# Patient Record
Sex: FEMALE | Race: WHITE | NOT HISPANIC OR LATINO
[De-identification: names, ages, dates, MRNs, and addresses within clinical notes are randomized per-mention and may not be internally consistent; named-entity substitution may affect disease eponyms.]

---

## 2018-11-27 PROBLEM — Z00.00 ENCOUNTER FOR PREVENTIVE HEALTH EXAMINATION: Status: ACTIVE | Noted: 2018-11-27

## 2018-12-07 ENCOUNTER — APPOINTMENT (OUTPATIENT)
Dept: THORACIC SURGERY | Facility: HOSPITAL | Age: 83
End: 2018-12-07
Payer: MEDICARE

## 2018-12-07 PROCEDURE — ZZZZZ: CPT

## 2018-12-19 ENCOUNTER — RECORD ABSTRACTING (OUTPATIENT)
Age: 83
End: 2018-12-19

## 2018-12-19 DIAGNOSIS — Z86.69 PERSONAL HISTORY OF OTHER DISEASES OF THE NERVOUS SYSTEM AND SENSE ORGANS: ICD-10-CM

## 2018-12-19 DIAGNOSIS — Z82.49 FAMILY HISTORY OF ISCHEMIC HEART DISEASE AND OTHER DISEASES OF THE CIRCULATORY SYSTEM: ICD-10-CM

## 2018-12-19 DIAGNOSIS — Z87.39 PERSONAL HISTORY OF OTHER DISEASES OF THE MUSCULOSKELETAL SYSTEM AND CONNECTIVE TISSUE: ICD-10-CM

## 2018-12-19 DIAGNOSIS — Z80.1 FAMILY HISTORY OF MALIGNANT NEOPLASM OF TRACHEA, BRONCHUS AND LUNG: ICD-10-CM

## 2018-12-19 RX ORDER — MULTIVITAMIN
TABLET ORAL
Refills: 0 | Status: ACTIVE | COMMUNITY

## 2018-12-24 ENCOUNTER — APPOINTMENT (OUTPATIENT)
Dept: CARDIOLOGY | Facility: CLINIC | Age: 83
End: 2018-12-24

## 2018-12-25 ENCOUNTER — RECORD ABSTRACTING (OUTPATIENT)
Age: 83
End: 2018-12-25

## 2018-12-26 ENCOUNTER — APPOINTMENT (OUTPATIENT)
Dept: CARDIOLOGY | Facility: CLINIC | Age: 83
End: 2018-12-26
Payer: MEDICARE

## 2018-12-26 VITALS — DIASTOLIC BLOOD PRESSURE: 62 MMHG | SYSTOLIC BLOOD PRESSURE: 130 MMHG

## 2018-12-26 VITALS
DIASTOLIC BLOOD PRESSURE: 60 MMHG | HEIGHT: 64 IN | SYSTOLIC BLOOD PRESSURE: 130 MMHG | BODY MASS INDEX: 22.2 KG/M2 | WEIGHT: 130 LBS | HEART RATE: 55 BPM

## 2018-12-26 PROCEDURE — 99214 OFFICE O/P EST MOD 30 MIN: CPT

## 2018-12-26 PROCEDURE — 93000 ELECTROCARDIOGRAM COMPLETE: CPT

## 2018-12-26 NOTE — PHYSICAL EXAM
[General Appearance - Well Developed] : well developed [Normal Appearance] : normal appearance [Well Groomed] : well groomed [General Appearance - Well Nourished] : well nourished [No Deformities] : no deformities [General Appearance - In No Acute Distress] : no acute distress [Normal Conjunctiva] : the conjunctiva exhibited no abnormalities [Eyelids - No Xanthelasma] : the eyelids demonstrated no xanthelasmas [Normal Oral Mucosa] : normal oral mucosa [No Oral Pallor] : no oral pallor [No Oral Cyanosis] : no oral cyanosis [Normal Jugular Venous A Waves Present] : normal jugular venous A waves present [Normal Jugular Venous V Waves Present] : normal jugular venous V waves present [No Jugular Venous Blackman A Waves] : no jugular venous blackman A waves [Respiration, Rhythm And Depth] : normal respiratory rhythm and effort [Exaggerated Use Of Accessory Muscles For Inspiration] : no accessory muscle use [Auscultation Breath Sounds / Voice Sounds] : lungs were clear to auscultation bilaterally [Heart Rate And Rhythm] : heart rate and rhythm were normal [Heart Sounds] : normal S1 and S2 [Murmurs] : no murmurs present [Abdomen Soft] : soft [Abdomen Tenderness] : non-tender [Abdomen Mass (___ Cm)] : no abdominal mass palpated [Skin Color & Pigmentation] : normal skin color and pigmentation [] : no rash [No Venous Stasis] : no venous stasis [Skin Lesions] : no skin lesions [No Skin Ulcers] : no skin ulcer [No Xanthoma] : no  xanthoma was observed [Oriented To Time, Place, And Person] : oriented to person, place, and time [Affect] : the affect was normal [Mood] : the mood was normal [No Anxiety] : not feeling anxious [FreeTextEntry1] : trace edema

## 2018-12-26 NOTE — ASSESSMENT
[FreeTextEntry1] : EKG in 12/26/18. Sinus bradycardia. Heart rate 55. Otherwise within normal limits.

## 2018-12-26 NOTE — ADDENDUM
[FreeTextEntry1] : This report was generated using Dragon Dictation. Please excuse obvious typographical errors and contact this office for any questions. Any preliminary copy of this note given to the patient at the time of this visit has not been proofread or edited. This note is a part of a shared electronic record used by our physicians and may contain information generated by other physicians in this practice in addition to your visit with this office.

## 2018-12-26 NOTE — DISCUSSION/SUMMARY
[FreeTextEntry1] : Cerebrovascular disease, possible cerebrovascular events. At the present time there is no correlation of these events with a cardiac arrhythmia. We are following the patient with an implantable looping monitor. I suggest additional workup as directed by her primary care physician. She is under the care of a neurologist, Dr. Patt Goss. Evidence of small vessel disease on previous MRI. Continue aspirin.\par \par Supraventricular tachycardia. At this point no correlation with her events. Continue aspirin and beta blocker.\par \par Valvular disease. Minor findings as noted on her echocardiogram.

## 2018-12-26 NOTE — HISTORY OF PRESENT ILLNESS
[FreeTextEntry1] : Since her last assessment the patient has had an implanted Loop monitor. She has had a number of events perhaps 3 episodes of staring or cognitive impairment. One time she reported some nausea.\par \par We have reviewed the events through the computer. There have been no cardiac arrhythmias associated with these events\par \par There have been no acute symptoms of shortness of breath, chest discomfort, palpitation, fainting

## 2018-12-26 NOTE — REASON FOR VISIT
[FreeTextEntry1] : This 85-year-old female patient presents for continuing evaluation of episodic possible cerebral vascular events manifested as cognitive impairment and change in mental status, cardiac arrhythmias, valve abnormalities.\par \par She has additional medical problems as noted.\par \par This is her first visit after implantation of a loop monitor by Doctor Preston.

## 2019-05-06 ENCOUNTER — RX RENEWAL (OUTPATIENT)
Age: 84
End: 2019-05-06

## 2019-06-19 ENCOUNTER — APPOINTMENT (OUTPATIENT)
Dept: CARDIOLOGY | Facility: CLINIC | Age: 84
End: 2019-06-19

## 2019-07-02 ENCOUNTER — APPOINTMENT (OUTPATIENT)
Dept: HEMATOLOGY ONCOLOGY | Facility: CLINIC | Age: 84
End: 2019-07-02
Payer: MEDICARE

## 2019-07-02 ENCOUNTER — RESULT REVIEW (OUTPATIENT)
Age: 84
End: 2019-07-02

## 2019-07-02 VITALS
SYSTOLIC BLOOD PRESSURE: 136 MMHG | HEIGHT: 63.78 IN | BODY MASS INDEX: 21.43 KG/M2 | RESPIRATION RATE: 18 BRPM | DIASTOLIC BLOOD PRESSURE: 54 MMHG | OXYGEN SATURATION: 100 % | WEIGHT: 124 LBS | TEMPERATURE: 98 F | HEART RATE: 50 BPM

## 2019-07-02 DIAGNOSIS — Z82.49 FAMILY HISTORY OF ISCHEMIC HEART DISEASE AND OTHER DISEASES OF THE CIRCULATORY SYSTEM: ICD-10-CM

## 2019-07-02 PROCEDURE — 99204 OFFICE O/P NEW MOD 45 MIN: CPT

## 2019-07-06 NOTE — ASSESSMENT
[FreeTextEntry1] : 86 yo female with h/o left breast cancer, lobular, ER/WA positive, [id number] negative.\par Patient under observation, s/p RT, arimidex\par - mammogram June 2019 - reviewed\par - continue surveillance\par - bone density due\par - Discussed weight control and healthy eating habits.  Encourage to participate in moderate exercise.\par

## 2019-07-06 NOTE — PHYSICAL EXAM
[Fully active, able to carry on all pre-disease performance without restriction] : Status 0 - Fully active, able to carry on all pre-disease performance without restriction [Normal] : affect appropriate [de-identified] : left breast scar

## 2019-07-06 NOTE — HISTORY OF PRESENT ILLNESS
[de-identified] : 85 year old presents today as a transfer of care from Dr. Burkett- diagnosed with breast cancer in .  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  Patient underwent a left breast wide excision on 2006 along with RT.  She was seeing oncologist every 6 months and here for a transfer of care.  Patient is unclear if she took Arimidex as per note from other oncologist.  Patient is G3, P3- denies use of HRT in past or currently.  She was never a smoker- only family history of malignancy was her father who is  from lung cancer- denies having genetic testing done in past.  \par \par \par \par \par Mammogram done 2019-\par Bone Density- 2017- osteoporotic bone  mineral density of the radius and ulna and osteoporotic bone mineral- she states she received an infusion for OP in the past.

## 2019-10-14 DIAGNOSIS — Z01.89 ENCOUNTER FOR OTHER SPECIFIED SPECIAL EXAMINATIONS: ICD-10-CM

## 2019-10-14 DIAGNOSIS — R94.30 ABNORMAL RESULT OF CARDIOVASCULAR FUNCTION STUDY, UNSPECIFIED: ICD-10-CM

## 2019-10-15 ENCOUNTER — APPOINTMENT (OUTPATIENT)
Dept: CARDIOLOGY | Facility: CLINIC | Age: 84
End: 2019-10-15
Payer: MEDICARE

## 2019-10-15 ENCOUNTER — NON-APPOINTMENT (OUTPATIENT)
Age: 84
End: 2019-10-15

## 2019-10-15 VITALS
DIASTOLIC BLOOD PRESSURE: 70 MMHG | WEIGHT: 124 LBS | SYSTOLIC BLOOD PRESSURE: 126 MMHG | BODY MASS INDEX: 21.43 KG/M2 | HEIGHT: 63.78 IN | HEART RATE: 51 BPM

## 2019-10-15 PROCEDURE — 99214 OFFICE O/P EST MOD 30 MIN: CPT

## 2019-10-15 PROCEDURE — 93000 ELECTROCARDIOGRAM COMPLETE: CPT

## 2019-10-15 NOTE — REASON FOR VISIT
[FreeTextEntry1] : Ms. ROSEY RODGERS has the following problem list:\par \par Cardiac arrhythmia/ implanted loop monitoring.\par Valve abnormalities\par Episodic cerebrovascular events/cognitive impairment\par \par She has additional medical problems as noted.\par \par Her primary care physician is Dr. Vann\par

## 2019-10-15 NOTE — HISTORY OF PRESENT ILLNESS
[FreeTextEntry1] : This 85 year-old female patient presents for cardiovascular evaluation.\par \par Her problem list is as noted above.\par \par Since her last examination in December 2018 she has had some medical interactions.\par \par She reports that she had bilateral carpal tunnel surgery.\par She did also suffer a fall with a right elbow injury, treated conservatively.\par She also reports that she had Mohs surgery on her nose.\par She also reports she had a neurologic evaluation with Dr. Morton.\par \par Despite these issues she reports no major events or hospitalizations.  She is active and walking.  There are no exertional symptoms of shortness of breath, chest discomfort, palpitation, lightheadedness.\par \par She continues to have issues with her memory and word finding.  She also feels periodically that her "head is full".  Evidently there was no major neurologic event on her neurologic evaluation.

## 2019-10-15 NOTE — DISCUSSION/SUMMARY
[FreeTextEntry1] : Brief recommendations and follow-up: (see above for details)\par \par Continue current routine.\par Patient had held her aspirin this will be resumed.\par I asked her to call her primary care physician to kindly forward a copy of her most recent laboratories, particularly her lipid profile.\par In view of her cerebrovascular disease I would like to see her LDL cholesterol at least less than 100.

## 2019-10-15 NOTE — ASSESSMENT
[FreeTextEntry1] : EKG 10/15/2019.  Sinus bradycardia.  Heart rate 52.  Otherwise within normal limits.\par EKG in 12/26/18. Sinus bradycardia. Heart rate 55. Otherwise within normal limits.

## 2019-11-05 ENCOUNTER — LABORATORY RESULT (OUTPATIENT)
Age: 84
End: 2019-11-05

## 2019-11-06 ENCOUNTER — APPOINTMENT (OUTPATIENT)
Dept: ENDOCRINOLOGY | Facility: CLINIC | Age: 84
End: 2019-11-06
Payer: MEDICARE

## 2019-11-06 VITALS
DIASTOLIC BLOOD PRESSURE: 64 MMHG | HEART RATE: 57 BPM | OXYGEN SATURATION: 99 % | WEIGHT: 125 LBS | BODY MASS INDEX: 22.15 KG/M2 | HEIGHT: 63 IN | SYSTOLIC BLOOD PRESSURE: 118 MMHG

## 2019-11-06 PROCEDURE — 99214 OFFICE O/P EST MOD 30 MIN: CPT

## 2019-11-06 NOTE — PHYSICAL EXAM
[Alert] : alert [No Acute Distress] : no acute distress [Well Nourished] : well nourished [Well Developed] : well developed [Normal Sclera/Conjunctiva] : normal sclera/conjunctiva [EOMI] : extra ocular movement intact [No Proptosis] : no proptosis [Normal Oropharynx] : the oropharynx was normal [No Thyroid Nodules] : there were no palpable thyroid nodules [No Respiratory Distress] : no respiratory distress [No Accessory Muscle Use] : no accessory muscle use [Clear to Auscultation] : lungs were clear to auscultation bilaterally [Normal Rate] : heart rate was normal  [Normal S1, S2] : normal S1 and S2 [Regular Rhythm] : with a regular rhythm [Pedal Pulses Normal] : the pedal pulses are present [No Edema] : there was no peripheral edema [Normal Bowel Sounds] : normal bowel sounds [Not Tender] : non-tender [Soft] : abdomen soft [Not Distended] : not distended [Post Cervical Nodes] : posterior cervical nodes [Anterior Cervical Nodes] : anterior cervical nodes [Axillary Nodes] : axillary nodes [Normal] : normal and non tender [No Spinal Tenderness] : no spinal tenderness [Spine Straight] : spine straight [No Stigmata of Cushings Syndrome] : no stigmata of cushings syndrome [Normal Gait] : normal gait [Normal Strength/Tone] : muscle strength and tone were normal [No Rash] : no rash [Acanthosis Nigricans] : no acanthosis nigricans [Normal Reflexes] : deep tendon reflexes were 2+ and symmetric [No Tremors] : no tremors [Oriented x3] : oriented to person, place, and time [de-identified] : mildly enlarged thyroid on exam

## 2019-11-06 NOTE — HISTORY OF PRESENT ILLNESS
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 \par       one Reclast infusion 8/19/18\par        labs reviewed, good , last Vit D with PCP 8/19 good\par last iPTH good last year\par        was on Fosamax once a week started 11/2013 stopped 9/2015\par        pt is a poor historian brought in records today\par        has h/o breast cancer wit bone Rx so not a candidate for forteo\par        was on femara\par        notes from Dr Hellerman reviewed\par        never had fractures\par        never had kidney stone \par        sees her dentist twice a year Dr Bailey , I called him and he cleared patient for Reclast see phone notes\par        "mother of 3 - 2006 required surgery for L breast cancer (Dr. Francisco/[facility]) followed by RT and then chemotherapy by Dr. Woodruff including Femara. Subsequently started on Fosamax for a while and then Actonel but eventually switched to calcitonin nasal spray. Takes calcium 500 plus DD 1400 plus multivit. Her dentist, Dr. Zeyad Bailey, in [place] agreed with her regarding a tooth extraction that Fosamax might have played a role. Recently ran out of calcitonin" per Dr Hellerman \par        8/2013 left hip and spine was osteopenia L spine T score -2.1\par        left hip was -2.3\par        left forearm -2.7\par        c/w 1997 was an increase in her L spine\par        no change at the hip\par        statistically significant left forearm\par        11/2017 \par        lumbar spine vertebrae are statistically ineligible and cannot be assesed\par        hip T score -2.3\par        femoral neck T score -3.2\par        left forearm T score -2.9\par        hip no change\par        wrist decrease by 6.7% c/w 1997, no change c/w 2015\par        has a healthy diet, has no coffe but dark chocolate\par        Calcium and Vit D3 does not know the dose 600mg-400 IU once a day\par        was also on Femara \par        also on MVI.

## 2019-12-05 ENCOUNTER — RESULT REVIEW (OUTPATIENT)
Age: 84
End: 2019-12-05

## 2020-01-06 ENCOUNTER — RESULT REVIEW (OUTPATIENT)
Age: 85
End: 2020-01-06

## 2020-01-06 ENCOUNTER — APPOINTMENT (OUTPATIENT)
Dept: HEMATOLOGY ONCOLOGY | Facility: CLINIC | Age: 85
End: 2020-01-06
Payer: MEDICARE

## 2020-01-06 VITALS
SYSTOLIC BLOOD PRESSURE: 124 MMHG | BODY MASS INDEX: 21.88 KG/M2 | OXYGEN SATURATION: 100 % | RESPIRATION RATE: 16 BRPM | WEIGHT: 123.5 LBS | DIASTOLIC BLOOD PRESSURE: 51 MMHG | HEIGHT: 62.99 IN | TEMPERATURE: 97.4 F | HEART RATE: 52 BPM

## 2020-01-06 PROCEDURE — 99214 OFFICE O/P EST MOD 30 MIN: CPT

## 2020-01-24 NOTE — HISTORY OF PRESENT ILLNESS
[de-identified] : 85 year old presents today as a transfer of care from Dr. Burkett- diagnosed with breast cancer in .  Pathology from 2006- Infiltrating lobular carcinoma grade II/III measures 0.73cm, ER 85%, DC 3%, HER 2 negative.  Patient underwent a left breast wide excision on 2006 along with RT.  She was seeing oncologist every 6 months and here for a transfer of care.  Patient is unclear if she took Arimidex as per note from other oncologist.  Patient is G3, P3- denies use of HRT in past or currently.  She was never a smoker- only family history of malignancy was her father who is  from lung cancer- denies having genetic testing done in past.  \par \par \par \par Mammogram done 2019-\par Bone Density- 2017- osteoporotic bone  mineral density of the radius and ulna and osteoporotic bone mineral- she states she received an infusion for OP in the past.   [de-identified] : Patient here for second visit after initial consultation for breast cancer. Last mammo was 6/24/19. Last bone density 12/5/19. X-rays of hip done last week in [place] showed mild arthritis.

## 2020-01-24 NOTE — PHYSICAL EXAM
[Fully active, able to carry on all pre-disease performance without restriction] : Status 0 - Fully active, able to carry on all pre-disease performance without restriction [Normal] : affect appropriate [de-identified] : left breast scar

## 2020-01-24 NOTE — ASSESSMENT
[FreeTextEntry1] : 87 yo female with h/o left breast cancer, lobular, ER/CA positive, HER2 douglas negative.\par Patient under observation, s/p RT, arimidex\par - mammogram June 2019 - reviewed\par - continue surveillance\par \par Osteoporosis \par - last bone density 12/19 \par T-score- 2.6, - 3.1 \par Risk factors: AI, Postmenopausal \par Recommended:\par 1. Vitamin D\par 2. Calcium supplement 500mg\par 3. Weight bearing exercises\par 4. Reclast s/p x 2- November 2019 \par \par \par - Discussed weight control and healthy eating habits.  Encourage to participate in moderate exercise.\par

## 2020-05-06 ENCOUNTER — LABORATORY RESULT (OUTPATIENT)
Age: 85
End: 2020-05-06

## 2020-05-06 ENCOUNTER — RX RENEWAL (OUTPATIENT)
Age: 85
End: 2020-05-06

## 2020-06-08 ENCOUNTER — APPOINTMENT (OUTPATIENT)
Dept: HEMATOLOGY ONCOLOGY | Facility: CLINIC | Age: 85
End: 2020-06-08
Payer: MEDICARE

## 2020-06-08 ENCOUNTER — RESULT REVIEW (OUTPATIENT)
Age: 85
End: 2020-06-08

## 2020-06-08 VITALS
TEMPERATURE: 98.1 F | HEART RATE: 56 BPM | HEIGHT: 62.99 IN | RESPIRATION RATE: 16 BRPM | OXYGEN SATURATION: 97 % | DIASTOLIC BLOOD PRESSURE: 71 MMHG | SYSTOLIC BLOOD PRESSURE: 148 MMHG | WEIGHT: 118 LBS | BODY MASS INDEX: 20.91 KG/M2

## 2020-06-08 PROCEDURE — 99214 OFFICE O/P EST MOD 30 MIN: CPT

## 2020-06-08 NOTE — ASSESSMENT
[FreeTextEntry1] : 85 yo female with h/o left breast cancer, lobular, ER/IL positive, HER2 douglas negative. 2006\par Patient under observation, s/p RT, arimidex completetd\par - mammogram June 2019 - reviewed\par - continue surveillance\par \par Osteoporosis \par - last bone density 12/19 \par T-score- 2.6, - 3.1 \par Risk factors: AI, Postmenopausal \par Recommended:\par 1. Vitamin D\par 2. Calcium supplement 500mg\par 3. Weight bearing exercises\par 4. Reclast s/p x 2- November 2019 \par \par \par - Discussed weight control and healthy eating habits.  Encourage to participate in moderate exercise.\par

## 2020-06-08 NOTE — PHYSICAL EXAM
[Fully active, able to carry on all pre-disease performance without restriction] : Status 0 - Fully active, able to carry on all pre-disease performance without restriction [Normal] : affect appropriate [de-identified] : left breast scar

## 2020-06-08 NOTE — HISTORY OF PRESENT ILLNESS
[de-identified] : 85 year old presents today as a transfer of care from Dr. Burkett- diagnosed with breast cancer in .  Pathology from 2006- Infiltrating lobular carcinoma grade II/III measures 0.73cm, ER 85%, KS 3%, HER 2 negative.  Patient underwent a left breast wide excision on 2006 along with RT.  She was seeing oncologist every 6 months and here for a transfer of care.  Patient is unclear if she took Arimidex as per note from other oncologist.  Patient is G3, P3- denies use of HRT in past or currently.  She was never a smoker- only family history of malignancy was her father who is  from lung cancer- denies having genetic testing done in past.  \par \par \par \par Mammogram done 2019-\par Bone Density- 2017- osteoporotic bone  mineral density of the radius and ulna and osteoporotic bone mineral- she states she received an infusion for OP in the past.   [de-identified] : Patient here for second visit after initial consultation for breast cancer. Last mammo was 6/24/19. Last bone density 12/5/19. X-rays of hip done last week in [place] showed mild arthritis.

## 2020-06-19 ENCOUNTER — NON-APPOINTMENT (OUTPATIENT)
Age: 85
End: 2020-06-19

## 2020-06-19 ENCOUNTER — APPOINTMENT (OUTPATIENT)
Dept: CARDIOLOGY | Facility: CLINIC | Age: 85
End: 2020-06-19
Payer: MEDICARE

## 2020-06-19 VITALS
HEIGHT: 64 IN | DIASTOLIC BLOOD PRESSURE: 64 MMHG | SYSTOLIC BLOOD PRESSURE: 140 MMHG | BODY MASS INDEX: 19.46 KG/M2 | WEIGHT: 114 LBS

## 2020-06-19 PROCEDURE — 93000 ELECTROCARDIOGRAM COMPLETE: CPT

## 2020-06-19 PROCEDURE — 99214 OFFICE O/P EST MOD 30 MIN: CPT

## 2020-06-19 NOTE — DISCUSSION/SUMMARY
[FreeTextEntry1] : Brief recommendations and follow-up: (see above for details)\par \par The patient's overall cardiovascular status is stable.\par Her arrhythmias appear under good control.\par At the patient's request, we have discontinued implanted loop monitoring.\par She will call her primary care physician and kindly forward a copy of her most recent blood work.\par Next routine cardiology visit 1 year

## 2020-06-19 NOTE — REASON FOR VISIT
[FreeTextEntry1] : Ms. ROSEY RODGERS has the following problem list:\par \par Cardiac arrhythmia/ implanted loop monitoring (disconnected).\par Valve abnormalities\par Episodic cerebrovascular events/cognitive impairment\par \par She has additional medical problems as noted.\par \par Her primary care physician is Dr. Vann\par

## 2020-06-19 NOTE — ASSESSMENT
[FreeTextEntry1] : EKG 6/19/2020.  Sinus bradycardia.  Heart rate 55.  Otherwise within normal limits.\par EKG 10/15/2019.  Sinus bradycardia.  Heart rate 52.  Otherwise within normal limits.\par EKG in 12/26/18. Sinus bradycardia. Heart rate 55. Otherwise within normal limits.

## 2020-06-19 NOTE — HISTORY OF PRESENT ILLNESS
[FreeTextEntry1] : This 86 year-old female patient presents for cardiovascular evaluation.\par \par Her problem list is as noted above.\par \par Since her last examination more than 6 months ago she reports no major events or hospitalizations.\par \par She does report some medical interactions.  She had routine follow-up with her primary care physician and was referred for cognitive evaluation for her memory loss by her neurologist, Dr. Morton.  The patient reports mild impairment.\par \par She has been very active and exercising.  There have been no exertional symptoms of shortness of breath, chest discomfort, palpitation, lightheadedness.

## 2020-07-08 ENCOUNTER — LABORATORY RESULT (OUTPATIENT)
Age: 85
End: 2020-07-08

## 2020-07-15 ENCOUNTER — APPOINTMENT (OUTPATIENT)
Dept: ENDOCRINOLOGY | Facility: CLINIC | Age: 85
End: 2020-07-15
Payer: MEDICARE

## 2020-07-15 VITALS
HEART RATE: 53 BPM | WEIGHT: 117 LBS | SYSTOLIC BLOOD PRESSURE: 112 MMHG | BODY MASS INDEX: 21.26 KG/M2 | DIASTOLIC BLOOD PRESSURE: 60 MMHG | HEIGHT: 62.3 IN | TEMPERATURE: 98.2 F | OXYGEN SATURATION: 98 %

## 2020-07-15 PROCEDURE — 99214 OFFICE O/P EST MOD 30 MIN: CPT

## 2020-10-21 ENCOUNTER — LABORATORY RESULT (OUTPATIENT)
Age: 85
End: 2020-10-21

## 2020-10-21 ENCOUNTER — APPOINTMENT (OUTPATIENT)
Dept: GASTROENTEROLOGY | Facility: CLINIC | Age: 85
End: 2020-10-21
Payer: MEDICARE

## 2020-10-21 VITALS
HEIGHT: 62 IN | WEIGHT: 117 LBS | HEART RATE: 56 BPM | DIASTOLIC BLOOD PRESSURE: 78 MMHG | SYSTOLIC BLOOD PRESSURE: 106 MMHG | BODY MASS INDEX: 21.53 KG/M2

## 2020-10-21 PROCEDURE — 99205 OFFICE O/P NEW HI 60 MIN: CPT | Mod: 25

## 2020-10-21 PROCEDURE — 99072 ADDL SUPL MATRL&STAF TM PHE: CPT

## 2020-10-21 NOTE — ASSESSMENT
[FreeTextEntry1] : labs/stool studies as listed. \par if unrevealing will consider trial of rifaximin\par return in 1 month, will consider colonoscopy at that time if symptoms perisist\par \par rectal bleeding, likely due to hemorrhoids, discussed colonoscopy to r/o proximal lesion. patient currently declines procedure. She understands risk of missing a malignancy or delayed dx of malignancy without colonoscopy and wishes to proceed as above. \par

## 2020-10-21 NOTE — REASON FOR VISIT
[Consultation] : a consultation visit [FreeTextEntry1] : Patient seen at the request of Dr. Raman Guaman for the evaluation of diarrhea

## 2020-10-21 NOTE — PHYSICAL EXAM
[General Appearance - Alert] : alert [General Appearance - In No Acute Distress] : in no acute distress [Sclera] : the sclera and conjunctiva were normal [Outer Ear] : the ears and nose were normal in appearance [Hearing Threshold Finger Rub Not Bent] : hearing was normal [Neck Appearance] : the appearance of the neck was normal [] : no respiratory distress [Abdomen Soft] : soft [Abdomen Tenderness] : non-tender [Abnormal Walk] : normal gait [Skin Color & Pigmentation] : normal skin color and pigmentation [Cranial Nerves] : cranial nerves 2-12 were intact [Oriented To Time, Place, And Person] : oriented to person, place, and time [FreeTextEntry1] : normal external exam, normal DONNELL, brown stool, guaiac negative, controls verified, chaperoned by Deedee Wilson MA

## 2020-10-21 NOTE — HISTORY OF PRESENT ILLNESS
[FreeTextEntry1] : 86 year F osteoporosis, HLD,  trivial AS, h/o SVT (s/p implanted loop recorder-disconnected), episodic CVA/cognitive impairment-follows with Dr. Stafford, h/o left breast ca 2006, s/p RT/ arimidex, \par presents for evaluation of diarrhea\par She is joined today by her daughter\par Diarrhea started on Sept 5, \par \par number of bm varies with eating. \par -5 urges to have bm per day\par -a lot of gas, stool consistency varies- solid and liquid\par + urgency\par -she gets up to urinate she has also had some nocturnal stools .\par -she has seen BRB on toilet paper. improved after using a cream. \par -no n/v/f/c, no abd pain\par -good appetite, no weight loss\par -one episode of fecal incontinence\par -she is taking 2 imodiums per day\par -she has eliminated diary which is somewhat helpful. \par \par no diet changes, no hospitalizations, no abx. no new medications prior to onset of symptoms\par normal bowel pattern is -1 bm per day, formed brown stool\par \par stool sample Oct 2- nothing infectious\par she is concerned about Zn deficiency\par \par remote h/o colonoscopy\par

## 2020-10-22 LAB
BASOPHILS # BLD AUTO: 0.04 K/UL
BASOPHILS NFR BLD AUTO: 0.7 %
EOSINOPHIL # BLD AUTO: 0.14 K/UL
EOSINOPHIL NFR BLD AUTO: 2.5 %
HCT VFR BLD CALC: 39.9 %
HGB BLD-MCNC: 12.8 G/DL
IMM GRANULOCYTES NFR BLD AUTO: 0.2 %
LYMPHOCYTES # BLD AUTO: 1.37 K/UL
LYMPHOCYTES NFR BLD AUTO: 24.9 %
MAN DIFF?: NORMAL
MCHC RBC-ENTMCNC: 32.1 GM/DL
MCHC RBC-ENTMCNC: 33.1 PG
MCV RBC AUTO: 103.1 FL
MONOCYTES # BLD AUTO: 0.67 K/UL
MONOCYTES NFR BLD AUTO: 12.2 %
NEUTROPHILS # BLD AUTO: 3.27 K/UL
NEUTROPHILS NFR BLD AUTO: 59.5 %
PLATELET # BLD AUTO: 310 K/UL
RBC # BLD: 3.87 M/UL
RBC # FLD: 13.7 %
WBC # FLD AUTO: 5.5 K/UL

## 2020-10-24 LAB
ALBUMIN SERPL ELPH-MCNC: 4.1 G/DL
ALP BLD-CCNC: 95 U/L
ALT SERPL-CCNC: 25 U/L
ANION GAP SERPL CALC-SCNC: 13 MMOL/L
AST SERPL-CCNC: 32 U/L
BILIRUB SERPL-MCNC: 0.3 MG/DL
BUN SERPL-MCNC: 19 MG/DL
CALCIUM SERPL-MCNC: 9.9 MG/DL
CHLORIDE SERPL-SCNC: 103 MMOL/L
CO2 SERPL-SCNC: 28 MMOL/L
CREAT SERPL-MCNC: 0.91 MG/DL
ENDOMYSIUM IGA SER QL: NEGATIVE
ENDOMYSIUM IGA TITR SER: NORMAL
GLIADIN IGA SER QL: <5 UNITS
GLIADIN IGG SER QL: <5 UNITS
GLIADIN PEPTIDE IGA SER-ACNC: NEGATIVE
GLIADIN PEPTIDE IGG SER-ACNC: NEGATIVE
GLUCOSE SERPL-MCNC: 134 MG/DL
IGA SER QL IEP: 83 MG/DL
LPL SERPL-CCNC: 44 U/L
POTASSIUM SERPL-SCNC: 4.4 MMOL/L
PROT SERPL-MCNC: 6.1 G/DL
SODIUM SERPL-SCNC: 144 MMOL/L

## 2020-10-26 LAB
TSH SERPL-ACNC: 1.34 UIU/ML
TTG IGA SER IA-ACNC: <1.2 U/ML
TTG IGA SER-ACNC: NEGATIVE
TTG IGG SER IA-ACNC: <1.2 U/ML
TTG IGG SER IA-ACNC: NEGATIVE
ZINC SERPL-MCNC: 71 UG/DL

## 2020-11-04 DIAGNOSIS — K58.0 IRRITABLE BOWEL SYNDROME WITH DIARRHEA: ICD-10-CM

## 2020-11-04 DIAGNOSIS — K58.9 IRRITABLE BOWEL SYNDROME W/OUT DIARRHEA: ICD-10-CM

## 2020-11-12 ENCOUNTER — TRANSCRIPTION ENCOUNTER (OUTPATIENT)
Age: 85
End: 2020-11-12

## 2020-11-25 ENCOUNTER — APPOINTMENT (OUTPATIENT)
Dept: GASTROENTEROLOGY | Facility: CLINIC | Age: 85
End: 2020-11-25
Payer: MEDICARE

## 2020-11-25 VITALS
HEIGHT: 62 IN | BODY MASS INDEX: 21.53 KG/M2 | WEIGHT: 117 LBS | HEART RATE: 62 BPM | DIASTOLIC BLOOD PRESSURE: 60 MMHG | SYSTOLIC BLOOD PRESSURE: 112 MMHG

## 2020-11-25 PROCEDURE — 99213 OFFICE O/P EST LOW 20 MIN: CPT

## 2020-11-26 NOTE — HISTORY OF PRESENT ILLNESS
[FreeTextEntry1] : 86 year F osteoporosis, HLD,  trivial AS, h/o SVT (s/p implanted loop recorder-disconnected)-Dr. Eid,  CVA 2018 /cognitive impairment-follows with Dr. Stafford, h/o left breast ca 2006, s/p RT, \par presents for follow up of diarrhea.\par daughter, Jackie,  joined by telephone. \par recent labs notable for fecal calprotectin 320\par She did not get rifaximin. \par she has been taking flagyl for 5 days, no side effects but no change in diarrhea either. \par symptoms are unchanged from prior visit. \par She agrees to colonoscopy now for further evaluation\par \par \par prior hx:\par Diarrhea started on Sept 5, \par \par number of bm varies with eating. \par -5 urges to have bm per day\par -a lot of gas, stool consistency varies- solid and liquid\par + urgency\par -she gets up to urinate she has also had some nocturnal stools .\par -she has seen BRB on toilet paper. improved after using a cream. \par -no n/v/f/c, no abd pain\par -good appetite, no weight loss\par -one episode of fecal incontinence\par -no longer taking Imodium \par -she has eliminated diary which is somewhat helpful. \par \par no diet changes, no hospitalizations, no abx. no new medications prior to onset of symptoms\par normal bowel pattern is -1 bm per day, formed brown stool\par \par stool sample Oct 2- nothing infectious\par she is concerned about Zn deficiency\par \par remote h/o colonoscopy\par

## 2020-11-26 NOTE — PHYSICAL EXAM
[General Appearance - Alert] : alert [General Appearance - In No Acute Distress] : in no acute distress [Sclera] : the sclera and conjunctiva were normal [Outer Ear] : the ears and nose were normal in appearance [Hearing Threshold Finger Rub Not [place]] : hearing was normal [Neck Appearance] : the appearance of the neck was normal [] : no respiratory distress [Abdomen Soft] : soft [Abdomen Tenderness] : non-tender [Abnormal Walk] : normal gait [Skin Color & Pigmentation] : normal skin color and pigmentation [Cranial Nerves] : cranial nerves 2-12 were intact [Oriented To Time, Place, And Person] : oriented to person, place, and time [FreeTextEntry1] : deferred

## 2020-11-26 NOTE — ASSESSMENT
[FreeTextEntry1] : Diarrhea with elevated fecal calprotectin, concerning for colitis, no improvement with metronidazole. colonoscopy with biopsy for further evaluation. \par Risks (including but not limited to sedation risks, infection, bleeding, perforation, possibility of missed lesions), benefits and alternatives to procedure, including not doing the procedure, were discussed with patient. Patient understood and agreed to proceed with colonoscopy. \par Colonoscopy preparation instructions reviewed with patient.\par Split MiraLAX/Dulcolax preparation starting day prior to procedure\par \par rectal bleeding, likely due to hemorrhoids,colonoscopy to r/o proximal lesion.\par

## 2020-12-07 ENCOUNTER — RESULT REVIEW (OUTPATIENT)
Age: 85
End: 2020-12-07

## 2020-12-11 ENCOUNTER — RESULT REVIEW (OUTPATIENT)
Age: 85
End: 2020-12-11

## 2020-12-13 ENCOUNTER — RESULT REVIEW (OUTPATIENT)
Age: 85
End: 2020-12-13

## 2020-12-14 ENCOUNTER — APPOINTMENT (OUTPATIENT)
Dept: GASTROENTEROLOGY | Facility: HOSPITAL | Age: 85
End: 2020-12-14

## 2020-12-18 ENCOUNTER — APPOINTMENT (OUTPATIENT)
Dept: ENDOCRINOLOGY | Facility: CLINIC | Age: 85
End: 2020-12-18
Payer: MEDICARE

## 2020-12-18 LAB
24R-OH-CALCIDIOL SERPL-MCNC: 66.5 PG/ML
25(OH)D3 SERPL-MCNC: 46.3 NG/ML
ALBUMIN MFR SERPL ELPH: 65.4 %
ALBUMIN SERPL ELPH-MCNC: 4.2 G/DL
ALBUMIN SERPL-MCNC: 4.1 G/DL
ALBUMIN/GLOB SERPL: 2 RATIO
ALP BLD-CCNC: 92 U/L
ALPHA1 GLOB MFR SERPL ELPH: 4.5 %
ALPHA1 GLOB SERPL ELPH-MCNC: 0.3 G/DL
ALPHA2 GLOB MFR SERPL ELPH: 11.1 %
ALPHA2 GLOB SERPL ELPH-MCNC: 0.7 G/DL
ALT SERPL-CCNC: 29 U/L
ANION GAP SERPL CALC-SCNC: 11 MMOL/L
AST SERPL-CCNC: 31 U/L
B-GLOBULIN MFR SERPL ELPH: 9.7 %
B-GLOBULIN SERPL ELPH-MCNC: 0.6 G/DL
BILIRUB SERPL-MCNC: 0.4 MG/DL
BUN SERPL-MCNC: 20 MG/DL
CALCIUM SERPL-MCNC: 9.5 MG/DL
CALCIUM SERPL-MCNC: 9.5 MG/DL
CHLORIDE SERPL-SCNC: 104 MMOL/L
CO2 SERPL-SCNC: 26 MMOL/L
COLLAGEN NTX UR-SCNC: 40
CREAT SERPL-MCNC: 0.87 MG/DL
CREAT UR-MCNC: 140 MG/DL
GAMMA GLOB FLD ELPH-MCNC: 0.6 G/DL
GAMMA GLOB MFR SERPL ELPH: 9.3 %
GLUCOSE SERPL-MCNC: 95 MG/DL
INTERPRETATION SERPL IEP-IMP: NORMAL
MAGNESIUM SERPL-MCNC: 2.1 MG/DL
PARATHYROID HORMONE INTACT: 30 PG/ML
PHOSPHATE SERPL-MCNC: 4.2 MG/DL
POTASSIUM SERPL-SCNC: 4.3 MMOL/L
PROT SERPL-MCNC: 6.2 G/DL
SODIUM SERPL-SCNC: 140 MMOL/L

## 2020-12-18 PROCEDURE — 99214 OFFICE O/P EST MOD 30 MIN: CPT | Mod: 95

## 2020-12-20 NOTE — HISTORY OF PRESENT ILLNESS
[Home] : at home, [unfilled] , at the time of the visit. [Medical Office: ([facility])___] : at the medical office located in  [Verbal consent obtained from patient] : the patient, [unfilled] [FreeTextEntry1] :  86 yo WM coming for f/u for worsening osteoporosis  today asked me to have her daughter on the phone during the visit , that we did but she did not join to the video call\par noncompliant patient, last seen 3/2018 then 11/2019\par Reclast 4/2018 and 12/2019 had it twice so far with no side effects \par pt reports memory problems, seen Neurology tested good per pt 3hrs testing \par        grandson ortho specialist -hand \par     \par        labs reviewed, good , last Vit D with PCP 8/19 good\par last iPTH good last year\par        was on Fosamax once a week started 11/2013 stopped 9/2015\par        pt is a poor historian brought in records today\par        has h/o breast cancer with bone Rx so not a candidate for forteo\par        was on femara\par        notes from Dr Hellerman reviewed\par        never had fractures\par        never had kidney stone \par        sees her dentist twice a year Dr Bailey , I called him and he cleared patient for Reclast see phone notes\par        "mother of 3 - 2006 required surgery for L breast cancer (Dr. Francisco/[facility]) followed by RT and then chemotherapy by Dr. Woodruff including Femara. Subsequently started on Fosamax for a while and then Actonel but eventually switched to calcitonin nasal spray. Takes calcium 500 plus DD 1400 plus multivit. Her dentist, Dr. Zeyad Bailey, in [place] agreed with her regarding a tooth extraction that Fosamax might have played a role. Recently ran out of calcitonin" per Dr Hellerman \par        8/2013 left hip and spine was osteopenia L spine T score -2.1\par        left hip was -2.3\par        left forearm -2.7\par        c/w 1997 was an increase in her L spine\par        no change at the hip\par        statistically significant left forearm\par        11/2017 \par        lumbar spine vertebrae are statistically ineligible and cannot be assessed\par        hip T score -2.3\par        femoral neck T score -3.2\par        left forearm T score -2.9\par        hip no change\par        wrist decrease by 6.7% c/w 1997, no change c/w 2015\par        has a healthy diet, has no coffe but dark chocolate\par        Calcium and Vit D3 does not know the dose 600mg-400 IU once a day\par        was also on Femara \par        also on MVI.

## 2020-12-23 ENCOUNTER — NON-APPOINTMENT (OUTPATIENT)
Age: 85
End: 2020-12-23

## 2020-12-30 ENCOUNTER — APPOINTMENT (OUTPATIENT)
Dept: GASTROENTEROLOGY | Facility: CLINIC | Age: 85
End: 2020-12-30

## 2021-01-07 ENCOUNTER — NON-APPOINTMENT (OUTPATIENT)
Age: 86
End: 2021-01-07

## 2021-01-07 ENCOUNTER — APPOINTMENT (OUTPATIENT)
Dept: GASTROENTEROLOGY | Facility: CLINIC | Age: 86
End: 2021-01-07
Payer: COMMERCIAL

## 2021-01-07 PROCEDURE — 99442: CPT

## 2021-01-08 NOTE — ASSESSMENT
[FreeTextEntry1] : Diarrhea with elevated fecal calprotectin, due to lymphocytic colitis, improved with budesonide\par continue 6 mg daily x 1 month, then 3 mg daily x 1 month, then follow up in 2-3 months or sooner if needed. \par

## 2021-01-08 NOTE — HISTORY OF PRESENT ILLNESS
[Home] : at home, [unfilled] , at the time of the visit. [Medical Office: ([facility])___] : at the medical office located in  [Verbal consent obtained from patient] : the patient, [unfilled] [FreeTextEntry1] : 87 year old F PMH osteoporosis, HLD,  trivial AS, h/o SVT (s/p implanted loop recorder-disconnected)-Dr. Eid,  CVA 2018 /cognitive impairment-follows with Dr. Stafford, h/o left breast ca 2006, s/p RT, \par presents for follow up of diarrhea.\par \par today, she is doing much better on budesonide 6 mg daily. bowel movements normal  \par no further episodes of confusion (thought to be due to budesonide when she was on 9 mg dose)\par eating well. \par no brbpr/melena\par no weight loss. \par \par Colonoscopy for diarrhea on 12/15/20 showed lymphocytic colitis\par After procedure patient started budesonide 9 mg, she improved quickly, although had 1 episodes of confusion that was thought could be due to initiation of steroids.

## 2021-01-17 ENCOUNTER — RESULT REVIEW (OUTPATIENT)
Age: 86
End: 2021-01-17

## 2021-01-31 ENCOUNTER — RESULT REVIEW (OUTPATIENT)
Age: 86
End: 2021-01-31

## 2021-03-30 ENCOUNTER — APPOINTMENT (OUTPATIENT)
Dept: HEMATOLOGY ONCOLOGY | Facility: CLINIC | Age: 86
End: 2021-03-30
Payer: MEDICARE

## 2021-03-30 ENCOUNTER — RESULT REVIEW (OUTPATIENT)
Age: 86
End: 2021-03-30

## 2021-03-30 VITALS
TEMPERATURE: 97.9 F | HEART RATE: 64 BPM | SYSTOLIC BLOOD PRESSURE: 106 MMHG | RESPIRATION RATE: 16 BRPM | DIASTOLIC BLOOD PRESSURE: 50 MMHG | WEIGHT: 116 LBS | BODY MASS INDEX: 21.35 KG/M2 | HEIGHT: 62 IN | OXYGEN SATURATION: 98 %

## 2021-03-30 DIAGNOSIS — R41.89 OTHER SYMPTOMS AND SIGNS INVOLVING COGNITIVE FUNCTIONS AND AWARENESS: ICD-10-CM

## 2021-03-30 PROCEDURE — 99072 ADDL SUPL MATRL&STAF TM PHE: CPT

## 2021-03-30 PROCEDURE — 99214 OFFICE O/P EST MOD 30 MIN: CPT

## 2021-04-30 ENCOUNTER — RX RENEWAL (OUTPATIENT)
Age: 86
End: 2021-04-30

## 2021-05-04 PROBLEM — R41.89 COGNITIVE IMPAIRMENT: Status: ACTIVE | Noted: 2020-06-19

## 2021-05-04 NOTE — PHYSICAL EXAM
[Fully active, able to carry on all pre-disease performance without restriction] : Status 0 - Fully active, able to carry on all pre-disease performance without restriction [Normal] : affect appropriate [de-identified] : left breast scar

## 2021-05-04 NOTE — REVIEW OF SYSTEMS
[Negative] : Allergic/Immunologic [Joint Pain] : joint pain [Muscle Pain] : muscle pain [FreeTextEntry9] : right arm

## 2021-05-04 NOTE — HISTORY OF PRESENT ILLNESS
[de-identified] : 85 year old presents today as a transfer of care from Dr. Burkett- diagnosed with breast cancer in .  Pathology from 2006- Infiltrating lobular carcinoma grade II/III measures 0.73cm, ER 85%, OR 3%, HER 2 negative.  Patient underwent a left breast wide excision on 2006 along with RT.  She was seeing oncologist every 6 months and here for a transfer of care.  Patient is unclear if she took Arimidex as per note from other oncologist.  Patient is G3, P3- denies use of HRT in past or currently.  She was never a smoker- only family history of malignancy was her father who is  from lung cancer- denies having genetic testing done in past.  \par \par \par \par Mammogram done 2019-\par Bone Density- 2017- osteoporotic bone  mineral density of the radius and ulna and osteoporotic bone mineral- she states she received an infusion for OP in the past.   [de-identified] : Patient here for second visit after initial consultation for breast cancer. Patient had a colonoscopy and patient received both her COVID shots. Patient states she has a problem with her right arm from lifting heavy objects, patient has had two xrays which were negative for fractures and following up with ortho soon. Patient received a dose of Reclased  in Jan 2021, for her bones.

## 2021-05-04 NOTE — ASSESSMENT
[FreeTextEntry1] : 88 yo female with h/o left breast cancer, lobular, ER/FL positive, HER2 douglas negative. 2006\par Patient under observation, s/p RT, Arimidex completed\par - mammogram June 2020 - reviewed\par - continue surveillance\par \par Osteoporosis \par - last bone density 12/19 \par T-score- 2.6, - 3.1 \par Risk factors: AI, Postmenopausal \par Recommended:\par 1. Vitamin D\par 2. Calcium supplement 500mg\par 3. Weight bearing exercises\par 4. Reclast s/p x 3- January 2021\par \par \par - Hypogammaglobulinemia - No infections, surveillance\par \par - Discussed weight control and healthy eating habits.  Encourage to participate in moderate exercise.\par

## 2021-06-21 ENCOUNTER — NON-APPOINTMENT (OUTPATIENT)
Age: 86
End: 2021-06-21

## 2021-06-22 ENCOUNTER — APPOINTMENT (OUTPATIENT)
Dept: CARDIOLOGY | Facility: CLINIC | Age: 86
End: 2021-06-22
Payer: MEDICARE

## 2021-06-22 ENCOUNTER — NON-APPOINTMENT (OUTPATIENT)
Age: 86
End: 2021-06-22

## 2021-06-22 VITALS
HEART RATE: 63 BPM | OXYGEN SATURATION: 96 % | DIASTOLIC BLOOD PRESSURE: 56 MMHG | SYSTOLIC BLOOD PRESSURE: 110 MMHG | RESPIRATION RATE: 13 BRPM | TEMPERATURE: 97.1 F | BODY MASS INDEX: 19.51 KG/M2 | HEIGHT: 62 IN | WEIGHT: 106 LBS

## 2021-06-22 PROCEDURE — 99072 ADDL SUPL MATRL&STAF TM PHE: CPT

## 2021-06-22 PROCEDURE — 93000 ELECTROCARDIOGRAM COMPLETE: CPT

## 2021-06-22 PROCEDURE — 99214 OFFICE O/P EST MOD 30 MIN: CPT

## 2021-06-22 RX ORDER — RIFAXIMIN 550 MG/1
550 TABLET ORAL
Qty: 42 | Refills: 0 | Status: DISCONTINUED | COMMUNITY
Start: 2020-11-04 | End: 2021-06-22

## 2021-06-22 RX ORDER — METRONIDAZOLE 250 MG/1
250 TABLET ORAL
Qty: 30 | Refills: 0 | Status: DISCONTINUED | COMMUNITY
Start: 2020-11-17 | End: 2021-06-22

## 2021-06-22 RX ORDER — BUDESONIDE 3 MG/1
3 CAPSULE, COATED PELLETS ORAL
Qty: 90 | Refills: 1 | Status: DISCONTINUED | COMMUNITY
Start: 2020-12-16 | End: 2021-06-22

## 2021-06-22 NOTE — DISCUSSION/SUMMARY
[FreeTextEntry1] : Brief recommendations and follow-up: (see above for details)\par \par The patient's overall cardiovascular status is well compensated.\par Arrhythmias appear under good control.\par I advised her she should take her pindolol 5 mg once a day.  If she has more palpitation she may take a second pill on an as-needed basis.\par She should continue aspirin 81 mg once a day.\par The family will call her primary care physician to kindly provide a copy of her lipid profile.\par I will order an echocardiogram.\par Next routine office visit 1 year.

## 2021-06-22 NOTE — HISTORY OF PRESENT ILLNESS
[FreeTextEntry1] : This 87 year-old female patient presents for cardiovascular evaluation.\par \par Her problem list is as noted above.\par \par Her last examination 1 year ago she reports no major events or hospitalizations.  She has had follow-up with her endocrinologist and oncologist.  She has also seen her primary care physician and had laboratories reportedly.  They are not a my receipt.  I have asked the family to call to kindly provide.\par \par There are no acute symptoms of shortness of breath, chest discomfort, palpitation, lightheadedness, fainting.\par \par She does have some orthopedic limitations and has had a number of falls.  She did have an emergency room visit.  Thankfully there was no fracture.  She reports she is undergoing some physical therapy currently directed toward her right shoulder.\par

## 2021-06-22 NOTE — CARDIOLOGY SUMMARY
[de-identified] : Holter 3/8/17. Sinus rhythm. Rare VPC. Occasional APC. 2 short, less than 4 beat, atrial\par     runs. No diary entries.\par  [de-identified] : Stress test 5/21/2010. Normal. 6 minutes. Beny stage II.\par  [de-identified] : Echo 1/2/17. Normal LV function. EF 65%. Trivial aortic sclerosis. Trace AI. Trace MR. Mild\par     TR. PA 27-32, normal.\par  [de-identified] : Carotid duplex 10/22/18. No hemodynamically significant stenosis.\par  [de-identified] :   Brain MRI. 2/23/17. Multiple foci of small vessel ischemia/infarction.

## 2021-06-22 NOTE — REASON FOR VISIT
[FreeTextEntry1] : Ms. ROSEY RODGERS has the following problem list:\par \par Cardiac arrhythmia/ implanted loop monitoring (disconnected).\par Valve abnormalities\par Episodic cerebrovascular events/cognitive impairment\par \par She has additional medical problems as noted.\par \par Her primary care physician is Dr. Vann\par \par The patient's son Jorden assists with the history.\par

## 2021-06-22 NOTE — ASSESSMENT
[FreeTextEntry1] : EKG 6/22/2021.  Sinus bradycardia.  Heart rate 55.  Otherwise within normal limits.\par EKG 6/19/2020.  Sinus bradycardia.  Heart rate 55.  Otherwise within normal limits.\par EKG 10/15/2019.  Sinus bradycardia.  Heart rate 52.  Otherwise within normal limits.\par EKG in 12/26/18. Sinus bradycardia. Heart rate 55. Otherwise within normal limits.

## 2021-06-22 NOTE — REVIEW OF SYSTEMS
[Negative] : Heme/Lymph [FreeTextEntry3] : History of right cataract surgery. [FreeTextEntry5] : See HPI. [FreeTextEntry7] : She has been prone to constipation but improved. [FreeTextEntry8] : She reports nocturia x2. [FreeTextEntry9] : Multijoint arthritis.  Current right shoulder injury undergoing physical therapy. [de-identified] : She has memory loss.

## 2021-07-06 ENCOUNTER — RESULT REVIEW (OUTPATIENT)
Age: 86
End: 2021-07-06

## 2021-07-12 ENCOUNTER — APPOINTMENT (OUTPATIENT)
Dept: HEMATOLOGY ONCOLOGY | Facility: CLINIC | Age: 86
End: 2021-07-12

## 2021-09-07 ENCOUNTER — RESULT REVIEW (OUTPATIENT)
Age: 86
End: 2021-09-07

## 2021-12-11 ENCOUNTER — LABORATORY RESULT (OUTPATIENT)
Age: 86
End: 2021-12-11

## 2021-12-15 ENCOUNTER — APPOINTMENT (OUTPATIENT)
Dept: ENDOCRINOLOGY | Facility: CLINIC | Age: 86
End: 2021-12-15
Payer: MEDICARE

## 2021-12-15 VITALS
WEIGHT: 113 LBS | DIASTOLIC BLOOD PRESSURE: 80 MMHG | OXYGEN SATURATION: 99 % | SYSTOLIC BLOOD PRESSURE: 130 MMHG | HEIGHT: 61.5 IN | BODY MASS INDEX: 21.06 KG/M2 | HEART RATE: 51 BPM

## 2021-12-15 DIAGNOSIS — M81.0 AGE-RELATED OSTEOPOROSIS W/OUT CURRENT PATHOLOGICAL FRACTURE: ICD-10-CM

## 2021-12-15 PROCEDURE — 99215 OFFICE O/P EST HI 40 MIN: CPT

## 2021-12-15 NOTE — HISTORY OF PRESENT ILLNESS
[FreeTextEntry1] :  86 yo WM coming for f/u for worsening osteoporosis  today asked me to have her daughter on the phone during the visit , that we did but she did not join to the video call\par noncompliant patient, last seen 3/2018 then 11/2019\par Reclast 4/2018 and 12/2019, 2/21  had it three times  so far with no side effects \par pt reports memory problems, seen Neurology tested good per pt 3hrs testing \par        grandson ortho specialist -hand \par     \par        labs reviewed, good , last Vit D with PCP 8/19 good\par last iPTH good last year\par        was on Fosamax once a week started 11/2013 stopped 9/2015\par        pt is a poor historian brought in records today\par        has h/o breast cancer with bone Rx so not a candidate for forteo\par        was on femara\par        notes from Dr Hellerman reviewed\par        never had fractures\par        never had kidney stone \par        sees her dentist twice a year Dr Bailey , I called him and he cleared patient for Reclast see phone notes\par        "mother of 3 - 2006 required surgery for L breast cancer (Dr. Francisco/[place]) followed by RT and then chemotherapy by Dr. Woodruff including Femara. Subsequently started on Fosamax for a while and then Actonel but eventually switched to calcitonin nasal spray. Takes calcium 500 plus DD 1400 plus multivit. Her dentist, Dr. Zeyad Bailey, in [place] agreed with her regarding a tooth extraction that Fosamax might have played a role. Recently ran out of calcitonin" per Dr Hellerman \par        8/2013 left hip and spine was osteopenia L spine T score -2.1\par        left hip was -2.3\par        left forearm -2.7\par        c/w 1997 was an increase in her L spine\par        no change at the hip\par        statistically significant left forearm\par        11/2017 \par        lumbar spine vertebrae are statistically ineligible and cannot be assessed\par        hip T score -2.3\par        femoral neck T score -3.2\par        left forearm T score -2.9\par        hip no change\par        wrist decrease by 6.7% c/w 1997, no change c/w 2015\par        has a healthy diet, has no coffe but dark chocolate\par        Calcium and Vit D3 does not know the dose 600mg-400 IU once a day\par        was also on Femara \par        also on MVI.

## 2021-12-20 ENCOUNTER — RESULT REVIEW (OUTPATIENT)
Age: 86
End: 2021-12-20

## 2022-01-12 ENCOUNTER — APPOINTMENT (OUTPATIENT)
Dept: GASTROENTEROLOGY | Facility: CLINIC | Age: 87
End: 2022-01-12
Payer: MEDICARE

## 2022-01-12 VITALS
HEIGHT: 65 IN | WEIGHT: 117 LBS | SYSTOLIC BLOOD PRESSURE: 96 MMHG | DIASTOLIC BLOOD PRESSURE: 60 MMHG | BODY MASS INDEX: 19.49 KG/M2

## 2022-01-12 DIAGNOSIS — K52.832 LYMPHOCYTIC COLITIS: ICD-10-CM

## 2022-01-12 PROCEDURE — 99214 OFFICE O/P EST MOD 30 MIN: CPT

## 2022-01-12 NOTE — ASSESSMENT
[FreeTextEntry1] : h/o lymphocytic colitis, \par not clinically active currently. \par advised patient to call if diarrhea recurs. If recurrent diarrhea, will try Imodium or bismuth to limit steroids due to concern for confusion and osteoporosis. \par continue lactose free diet. \par \par \par Rectal bleeding- appears due to hemorrhoids, no other source identified on colonoscopy ~  1 year ago\par -advised patient to avoid straining\par -high fiber diet\par -stool softeners/MiraLAX PRN\par

## 2022-01-12 NOTE — HISTORY OF PRESENT ILLNESS
[Home] : at home, [unfilled] , at the time of the visit. [Medical Office: ([facility])___] : at the medical office located in  [Verbal consent obtained from patient] : the patient, [unfilled] [FreeTextEntry1] : 88 year old F PMH osteoporosis, HLD,  trivial AS, h/o SVT (s/p implanted loop recorder-disconnected)-Dr. Eid,  CVA 2018 /cognitive impairment-follows with Dr. Stafford, h/o left breast ca 2006, s/p RT, \par presents for follow up of diarrhea.\par She is joined today by her daughter\par \par \par Colonoscopy for diarrhea on 12/15/20 showed lymphocytic colitis\par After procedure patient started budesonide 9 mg, she improved quickly, although had 1 episodes of confusion that was thought could be due to initiation of steroids. \par \par 108-->117 lbs since the summer. intentional weight gain due to previous weight loss, patient was having loose stools prior to this time and was nervous about eating \par She started lactose free diet and Ensure BID and loose stools and weight improved. \par \par 2 bm per day now since on lactose free diet since 10/2021, prior to this 3 bm per day. She has mostly firm stools, sometimes requires straining. When she really strains she sees small amount of BRB, uses aquaphor on perianal area after these episodes, which helps her feel better. \par no recent FI\par when stool is loose she has FI\par she has been off of budesonide for nearly  1 year\par she has not tried imodium/anti diarrheals\par \par \par she is due for reclast infusion next month \par \par PMD: Dr. Raman Zuniga

## 2022-01-12 NOTE — PHYSICAL EXAM
[General Appearance - Alert] : alert [General Appearance - In No Acute Distress] : in no acute distress [Hearing Threshold Finger Rub Not Labette] : hearing was normal [] : no respiratory distress [Oriented To Time, Place, And Person] : oriented to person, place, and time [Impaired Insight] : insight and judgment were intact [Affect] : the affect was normal [Mood] : the mood was normal [Apical Impulse] : the apical impulse was normal [Abdomen Soft] : soft [Abdomen Tenderness] : non-tender [Abnormal Walk] : normal gait [Skin Color & Pigmentation] : normal skin color and pigmentation

## 2022-02-03 ENCOUNTER — APPOINTMENT (OUTPATIENT)
Dept: SURGERY | Facility: CLINIC | Age: 87
End: 2022-02-03
Payer: MEDICARE

## 2022-02-03 VITALS — WEIGHT: 116 LBS | HEIGHT: 63 IN | BODY MASS INDEX: 20.55 KG/M2

## 2022-02-03 PROCEDURE — 99213 OFFICE O/P EST LOW 20 MIN: CPT

## 2022-03-11 ENCOUNTER — RX RENEWAL (OUTPATIENT)
Age: 87
End: 2022-03-11

## 2022-03-29 ENCOUNTER — APPOINTMENT (OUTPATIENT)
Dept: HEMATOLOGY ONCOLOGY | Facility: CLINIC | Age: 87
End: 2022-03-29
Payer: MEDICARE

## 2022-03-29 ENCOUNTER — RESULT REVIEW (OUTPATIENT)
Age: 87
End: 2022-03-29

## 2022-03-29 VITALS
HEIGHT: 63 IN | BODY MASS INDEX: 21.09 KG/M2 | TEMPERATURE: 97.2 F | WEIGHT: 119 LBS | HEART RATE: 56 BPM | RESPIRATION RATE: 18 BRPM | OXYGEN SATURATION: 98 % | DIASTOLIC BLOOD PRESSURE: 50 MMHG | SYSTOLIC BLOOD PRESSURE: 130 MMHG

## 2022-03-29 DIAGNOSIS — K62.5 HEMORRHAGE OF ANUS AND RECTUM: ICD-10-CM

## 2022-03-29 PROCEDURE — 99214 OFFICE O/P EST MOD 30 MIN: CPT | Mod: 25

## 2022-03-29 PROCEDURE — 36415 COLL VENOUS BLD VENIPUNCTURE: CPT

## 2022-04-03 PROBLEM — K62.5 RECTAL BLEEDING: Status: ACTIVE | Noted: 2020-10-21

## 2022-04-03 NOTE — ASSESSMENT
[FreeTextEntry1] : 89 yo female with h/o left breast cancer, lobular, ER/[place] positive, HER2 douglas negative. 2006\par Patient under observation, s/p RT, Arimidex completed\par - mammogram June 2021 - reviewed\par - continue surveillance with yearly mammogram , patient with good performance status, encouraged to continue surveillance \par \par Osteoporosis \par - last bone density 12/19 \par T-score- 2.6, - 3.1 \par Risk factors: AI, Postmenopausal \par Recommended:\par 1. Vitamin D\par 2. Calcium supplement 500mg\par 3. Weight bearing exercises\par 4. Reclast s/p x 3- January 2022\par \par \par - Hypogammaglobulinemia - No infections, surveillance\par \par - Discussed weight control and healthy eating habits.  Encourage to participate in moderate exercise.\par

## 2022-04-03 NOTE — HISTORY OF PRESENT ILLNESS
[de-identified] : 85 year old presents today as a transfer of care from Dr. Burkett- diagnosed with breast cancer in .  Pathology from 2006- Infiltrating lobular carcinoma grade II/III measures 0.73cm, ER 85%, NC 3%, HER 2 negative.  Patient underwent a left breast wide excision on 2006 along with RT.  She was seeing oncologist every 6 months and here for a transfer of care.  Patient is unclear if she took Arimidex as per note from other oncologist.  Patient is G3, P3- denies use of HRT in past or currently.  She was never a smoker- only family history of malignancy was her father who is  from lung cancer- denies having genetic testing done in past.  \par \par \par \par Mammogram done 2019-\par Bone Density- 2017- osteoporotic bone  mineral density of the radius and ulna and osteoporotic bone mineral- she states she received an infusion for OP in the past.   [de-identified] : Patient here for second visit after initial consultation for breast cancer. patient has osteoporosis and his receiving physical therapy for R hip pain. She is also c/o memory issues that shes a neurologist for.

## 2022-06-15 ENCOUNTER — APPOINTMENT (OUTPATIENT)
Dept: ENDOCRINOLOGY | Facility: CLINIC | Age: 87
End: 2022-06-15
Payer: MEDICARE

## 2022-06-15 VITALS
WEIGHT: 113 LBS | DIASTOLIC BLOOD PRESSURE: 72 MMHG | BODY MASS INDEX: 20.28 KG/M2 | HEART RATE: 57 BPM | OXYGEN SATURATION: 98 % | HEIGHT: 62.5 IN | SYSTOLIC BLOOD PRESSURE: 130 MMHG

## 2022-06-15 PROCEDURE — 99215 OFFICE O/P EST HI 40 MIN: CPT

## 2022-06-20 NOTE — HISTORY OF PRESENT ILLNESS
[FreeTextEntry1] :  87 yo WM coming for f/u for worsening osteoporosis  today asked me to have her daughter on the phone during the visit , that we did but she did not join to the video call\par noncompliant patient, last seen 3/2018 then 11/2019\par Reclast 4/2018 and 12/2019, 2/21 2/7/2022  had it four  times  so far with no side effects \par pt reports memory problems, seen Neurology tested good per pt 3hrs testing \par        grandson ortho specialist -hand \par     \par        labs reviewed, good , last Vit D with PCP 8/19 good\par last iPTH good last year\par        was on Fosamax once a week started 11/2013 stopped 9/2015\par        pt is a poor historian brought in records today\par        has h/o breast cancer with bone Rx so not a candidate for forteo\par        was on femara\par        notes from Dr Hellerman reviewed\par        never had fractures\par        never had kidney stone \par        sees her dentist twice a year Dr Bailey , I called him and he cleared patient for Reclast see phone notes\par        "mother of 3 - 2006 required surgery for L breast cancer (Dr. Francisco/[facility]) followed by RT and then chemotherapy by Dr. Woodruff including Femara. Subsequently started on Fosamax for a while and then Actonel but eventually switched to calcitonin nasal spray. Takes calcium 500 plus DD 1400 plus multivit. Her dentist, Dr. Zeyad Bailey, in [place] agreed with her regarding a tooth extraction that Fosamax might have played a role. Recently ran out of calcitonin" per Dr Hellerman \par        8/2013 left hip and spine was osteopenia L spine T score -2.1\par        left hip was -2.3\par        left forearm -2.7\par        c/w 1997 was an increase in her L spine\par        no change at the hip\par        statistically significant left forearm\par        11/2017 \par        lumbar spine vertebrae are statistically ineligible and cannot be assessed\par        hip T score -2.3\par        femoral neck T score -3.2\par        left forearm T score -2.9\par        hip no change\par        wrist decrease by 6.7% c/w 1997, no change c/w 2015\par        has a healthy diet, has no coffe but dark chocolate\par        Calcium and Vit D3 does not know the dose 600mg-400 IU once a day\par        was also on Femara \par        also on MVI.

## 2022-06-22 ENCOUNTER — NON-APPOINTMENT (OUTPATIENT)
Age: 87
End: 2022-06-22

## 2022-06-27 ENCOUNTER — NON-APPOINTMENT (OUTPATIENT)
Age: 87
End: 2022-06-27

## 2022-06-27 ENCOUNTER — APPOINTMENT (OUTPATIENT)
Dept: CARDIOLOGY | Facility: CLINIC | Age: 87
End: 2022-06-27
Payer: MEDICARE

## 2022-06-27 VITALS
TEMPERATURE: 98 F | HEART RATE: 60 BPM | HEIGHT: 62.5 IN | BODY MASS INDEX: 20.99 KG/M2 | SYSTOLIC BLOOD PRESSURE: 140 MMHG | DIASTOLIC BLOOD PRESSURE: 68 MMHG | OXYGEN SATURATION: 98 % | WEIGHT: 117 LBS

## 2022-06-27 PROCEDURE — 93000 ELECTROCARDIOGRAM COMPLETE: CPT

## 2022-06-27 PROCEDURE — 99215 OFFICE O/P EST HI 40 MIN: CPT

## 2022-06-27 RX ORDER — IPRATROPIUM BROMIDE 17 UG/1
17 AEROSOL, METERED RESPIRATORY (INHALATION) 4 TIMES DAILY
Refills: 0 | Status: DISCONTINUED | COMMUNITY
End: 2022-06-27

## 2022-06-27 RX ORDER — IPRATROPIUM BROMIDE 21 UG/1
0.03 SPRAY NASAL
Qty: 30 | Refills: 0 | Status: DISCONTINUED | COMMUNITY
Start: 2021-11-12

## 2022-06-27 RX ORDER — LIDOCAINE HYDROCHLORIDE 20 MG/ML
2 SOLUTION ORAL; TOPICAL
Qty: 200 | Refills: 0 | Status: DISCONTINUED | COMMUNITY
Start: 2021-12-29

## 2022-06-27 RX ORDER — DONEPEZIL HYDROCHLORIDE 10 MG/1
10 TABLET ORAL DAILY
Refills: 0 | Status: ACTIVE | COMMUNITY

## 2022-06-27 RX ORDER — KRILL/OM-3/DHA/EPA/PHOSPHO/AST 1000-230MG
81 CAPSULE ORAL DAILY
Refills: 0 | Status: DISCONTINUED | COMMUNITY
End: 2022-06-27

## 2022-06-27 NOTE — ASSESSMENT
[FreeTextEntry1] : EKG 6/27/2022.  Sinus bradycardia, heart rate 47 otherwise within normal limits.\par EKG 6/22/2021.  Sinus bradycardia.  Heart rate 55.  Otherwise within normal limits.\par EKG 6/19/2020.  Sinus bradycardia.  Heart rate 55.  Otherwise within normal limits.\par EKG 10/15/2019.  Sinus bradycardia.  Heart rate 52.  Otherwise within normal limits.\par EKG in 12/26/18. Sinus bradycardia. Heart rate 55. Otherwise within normal limits.

## 2022-06-27 NOTE — CARDIOLOGY SUMMARY
[de-identified] : Holter 3/8/17. Sinus rhythm. Rare VPC. Occasional APC. 2 short, less than 4 beat, atrial  runs. No diary entries.\par  [de-identified] : Stress test 5/21/2010. Normal. 6 minutes. Beny stage II.\par  [de-identified] : Echo 9/9/2021.  Normal LV function.  EF 70%.  Mild aortic sclerosis.  Mild TR.  Normal PA.\par \par Echo 1/2/17. Normal LV function. EF 65%. Trivial aortic sclerosis. Trace AI. Trace MR. Mild  TR. PA 27-32, normal.\par  [de-identified] : Carotid duplex 10/22/18. No hemodynamically significant stenosis.\par  [de-identified] :   Brain MRI. 2/23/17. Multiple foci of small vessel ischemia/infarction.

## 2022-06-27 NOTE — REASON FOR VISIT
[FreeTextEntry1] : Ms. ROSEY RODGERS has the following problem list:\par \par Cardiac arrhythmia/ implanted loop monitoring (disconnected).\par Valve abnormalities\par Cerebrovascular events/cognitive impairment\par \par She has additional medical problems as noted.\par This includes memory impairment.\par \par Her primary care physician is Dr. Vann\par \par The patient's son Jackie assists with the history.\par

## 2022-06-27 NOTE — PHYSICAL EXAM
[de-identified] : Delivered gain of 11 pounds since last examination. [de-identified] : Using a cane.  Multijoint arthritis. [de-identified] : Trace edema. [de-identified] : Memory impairment

## 2022-06-27 NOTE — DISCUSSION/SUMMARY
[FreeTextEntry1] : Brief recommendations and follow-up: (see above for details)\par \par The patient's overall cardiovascular status is stable but she does need some adjustment.\par Her resting heart rate is in the forties and I am recommending she reduce her pindolol to one half of a 5 mg tablet once a day.\par She may take a 2nd 1/2 tablet (2.5 mg) should she develop any symptoms of palpitation.\par The patient will provide copies of her most recent lipid profile, as available.\par Her echocardiogram was satisfactory.\par As noted the patient had not been taking her 81 mg aspirin, prescribed for her cerebrovascular disease.  I am recommending she resume this.\par Next routine visit 1 year.\par Medications reconciled, reviewed, renewed as indicated.\par Findings were reviewed with her daughter, Jackie who assists with the history.\par Today's visit 45 minutes.\par

## 2022-06-27 NOTE — HISTORY OF PRESENT ILLNESS
[FreeTextEntry1] : This 88 year-old female patient presents for cardiovascular evaluation.\par \par Her problem list is as noted above.\par \par The patient is accompanied by her daughter who assists with the history.\par \par Since our last examination 1 year ago she reports some medical interactions.  She is seeing her endocrinologist for osteoporosis and had breast cancer follow-up as well as a GI evaluation.\par \par She continues with significant functional impairment due to her multijoint arthritis and kyphoscoliosis.  She does have an exercise bicycle at home that she uses.  There are no exertional symptoms of chest discomfort, shortness of breath, palpitation, lightheadedness, fainting.  Overall she feels well.\par

## 2022-06-27 NOTE — REVIEW OF SYSTEMS
[FreeTextEntry3] : History of right cataract surgery. [FreeTextEntry5] : See HPI. [FreeTextEntry7] : She has been prone to constipation but improved. [FreeTextEntry8] : She reports nocturia x2. [FreeTextEntry9] : Multijoint arthritis.  Current right knee discomfort.  Chronic right shoulder injury undergoing physical therapy. [de-identified] : She has memory loss.

## 2022-07-26 ENCOUNTER — RESULT REVIEW (OUTPATIENT)
Age: 87
End: 2022-07-26

## 2022-07-26 ENCOUNTER — APPOINTMENT (OUTPATIENT)
Dept: HEMATOLOGY ONCOLOGY | Facility: CLINIC | Age: 87
End: 2022-07-26

## 2022-07-26 VITALS
BODY MASS INDEX: 20.63 KG/M2 | DIASTOLIC BLOOD PRESSURE: 75 MMHG | RESPIRATION RATE: 18 BRPM | OXYGEN SATURATION: 97 % | TEMPERATURE: 97.1 F | HEIGHT: 62.5 IN | SYSTOLIC BLOOD PRESSURE: 122 MMHG | HEART RATE: 58 BPM | WEIGHT: 115 LBS

## 2022-07-26 PROCEDURE — 36415 COLL VENOUS BLD VENIPUNCTURE: CPT

## 2022-07-26 PROCEDURE — 99213 OFFICE O/P EST LOW 20 MIN: CPT | Mod: 25

## 2022-07-26 NOTE — HISTORY OF PRESENT ILLNESS
[de-identified] : 85 year old presents today as a transfer of care from Dr. Burkett- diagnosed with breast cancer in .  Pathology from 2006- Infiltrating lobular carcinoma grade II/III measures 0.73cm, ER 85%, [place] 3%, HER 2 negative.  Patient underwent a left breast wide excision on 2006 along with RT.  She was seeing oncologist every 6 months and here for a transfer of care.  Patient is unclear if she took Arimidex as per note from other oncologist.  Patient is G3, P3- denies use of HRT in past or currently.  She was never a smoker- only family history of malignancy was her father who is  from lung cancer- denies having genetic testing done in past.  \par \par \par \par Mammogram done 2019-\par Bone Density- 2017- osteoporotic bone  mineral density of the radius and ulna and osteoporotic bone mineral- she states she received an infusion for OP in the past.   [de-identified] : Patient here for second visit after initial consultation for breast cancer. patient has osteoporosis and his receiving physical therapy for R hip pain. She is also c/o memory issues that shes a neurologist for.

## 2022-07-26 NOTE — ASSESSMENT
[FreeTextEntry1] : 89 yo female with h/o left breast cancer, lobular, ER/ND positive, HER2 douglas negative. 2006\par Patient under observation, s/p RT, Arimidex completed\par - mammogram June 2021 - reviewed- due now \par - continue surveillance with yearly mammogram , patient with good performance status, encouraged to continue surveillance \par \par Osteoporosis \par - last bone density 12/2021 \par T-score- 2.7, - 3.0 \par Risk factors: AI, Postmenopausal \par Recommended:\par 1. Vitamin D\par 2. Calcium supplement 500mg\par 3. Weight bearing exercises\par 4. Reclast s/p x 3- January 2022\par \par -elevated CEA- repeat today \par \par - Hypogammaglobulinemia - No infections, surveillance\par \par \par \par \par \par

## 2022-09-09 ENCOUNTER — RESULT REVIEW (OUTPATIENT)
Age: 87
End: 2022-09-09

## 2022-09-15 ENCOUNTER — APPOINTMENT (OUTPATIENT)
Dept: SURGERY | Facility: CLINIC | Age: 87
End: 2022-09-15

## 2022-09-15 ENCOUNTER — NON-APPOINTMENT (OUTPATIENT)
Age: 87
End: 2022-09-15

## 2022-09-15 VITALS
TEMPERATURE: 98.1 F | HEART RATE: 52 BPM | WEIGHT: 111 LBS | DIASTOLIC BLOOD PRESSURE: 60 MMHG | OXYGEN SATURATION: 97 % | HEIGHT: 62.5 IN | BODY MASS INDEX: 19.92 KG/M2 | SYSTOLIC BLOOD PRESSURE: 157 MMHG

## 2022-09-15 DIAGNOSIS — Z12.31 ENCOUNTER FOR SCREENING MAMMOGRAM FOR MALIGNANT NEOPLASM OF BREAST: ICD-10-CM

## 2022-09-15 DIAGNOSIS — C50.919 MALIGNANT NEOPLASM OF UNSPECIFIED SITE OF UNSPECIFIED FEMALE BREAST: ICD-10-CM

## 2022-09-15 PROCEDURE — 99213 OFFICE O/P EST LOW 20 MIN: CPT

## 2022-11-30 ENCOUNTER — APPOINTMENT (OUTPATIENT)
Dept: ENDOCRINOLOGY | Facility: CLINIC | Age: 87
End: 2022-11-30

## 2022-12-01 ENCOUNTER — LABORATORY RESULT (OUTPATIENT)
Age: 87
End: 2022-12-01

## 2022-12-12 ENCOUNTER — APPOINTMENT (OUTPATIENT)
Dept: ENDOCRINOLOGY | Facility: CLINIC | Age: 87
End: 2022-12-12
Payer: MEDICARE

## 2022-12-12 VITALS — BODY MASS INDEX: 20.63 KG/M2 | WEIGHT: 115 LBS | HEIGHT: 62.5 IN

## 2022-12-12 LAB
24R-OH-CALCIDIOL SERPL-MCNC: 80.5 PG/ML
25(OH)D3 SERPL-MCNC: 58.8 NG/ML
ALBUMIN MFR SERPL ELPH: 63.2 %
ALBUMIN SERPL ELPH-MCNC: 4 G/DL
ALBUMIN SERPL-MCNC: 3.9 G/DL
ALBUMIN/GLOB SERPL: 1.8 RATIO
ALP BLD-CCNC: 95 U/L
ALPHA1 GLOB MFR SERPL ELPH: 4.5 %
ALPHA1 GLOB SERPL ELPH-MCNC: 0.3 G/DL
ALPHA2 GLOB MFR SERPL ELPH: 12.3 %
ALPHA2 GLOB SERPL ELPH-MCNC: 0.8 G/DL
ALT SERPL-CCNC: 29 U/L
ANION GAP SERPL CALC-SCNC: 10 MMOL/L
AST SERPL-CCNC: 28 U/L
B-GLOBULIN MFR SERPL ELPH: 10.9 %
B-GLOBULIN SERPL ELPH-MCNC: 0.7 G/DL
BILIRUB SERPL-MCNC: 0.6 MG/DL
BUN SERPL-MCNC: 26 MG/DL
CALCIUM SERPL-MCNC: 9.6 MG/DL
CALCIUM SERPL-MCNC: 9.6 MG/DL
CHLORIDE SERPL-SCNC: 104 MMOL/L
CO2 SERPL-SCNC: 28 MMOL/L
CREAT SERPL-MCNC: 0.95 MG/DL
EGFR: 58 ML/MIN/1.73M2
GAMMA GLOB FLD ELPH-MCNC: 0.6 G/DL
GAMMA GLOB MFR SERPL ELPH: 9.1 %
GLUCOSE SERPL-MCNC: 70 MG/DL
INTERPRETATION SERPL IEP-IMP: NORMAL
MAGNESIUM SERPL-MCNC: 2.2 MG/DL
PARATHYROID HORMONE INTACT: 37 PG/ML
PHOSPHATE SERPL-MCNC: 4 MG/DL
POTASSIUM SERPL-SCNC: 5.4 MMOL/L
PROT SERPL-MCNC: 6.1 G/DL
SODIUM SERPL-SCNC: 142 MMOL/L
TSH SERPL-ACNC: 1.27 UIU/ML

## 2022-12-12 PROCEDURE — 99215 OFFICE O/P EST HI 40 MIN: CPT | Mod: 95

## 2022-12-29 ENCOUNTER — RESULT REVIEW (OUTPATIENT)
Age: 87
End: 2022-12-29

## 2023-01-02 NOTE — HISTORY OF PRESENT ILLNESS
[FreeTextEntry1] : Last Reclast 2/7/22\par  88 yo WM coming for f/u for worsening osteoporosis  today asked me to have her daughter on the phone during the visit , that we did but she did not join to the video call\par noncompliant patient, last seen 3/2018 then 11/2019\par Reclast 4/2018 and 12/2019, 2/21 2/7/2022  had it four  times  so far with no side effects \par pt reports memory problems, seen Neurology tested good per pt 3hrs testing \par        [facility] ortho specialist -hand \par     \par        labs reviewed, good , last Vit D with PCP 8/19 good\par last iPTH good last year\par        was on Fosamax once a week started 11/2013 stopped 9/2015\par        pt is a poor historian brought in records today\par        has h/o breast cancer with bone Rx so not a candidate for forteo\par        was on femara\par        notes from Dr Hellerman reviewed\par        never had fractures\par        never had kidney stone \par        sees her dentist twice a year Dr Bailey , I called him and he cleared patient for Reclast see phone notes\par        "mother of 3 - 2006 required surgery for L breast cancer (Dr. Francisco/[facility]) followed by RT and then chemotherapy by Dr. Woodruff including Femara. Subsequently started on Fosamax for a while and then Actonel but eventually switched to calcitonin nasal spray. Takes calcium 500 plus DD 1400 plus multivit. Her dentist, Dr. Zeyad Bailey, in [place] agreed with her regarding a tooth extraction that Fosamax might have played a role. Recently ran out of calcitonin" per Dr Hellerman \par        8/2013 left hip and spine was osteopenia L spine T score -2.1\par        left hip was -2.3\par        left forearm -2.7\par        c/w 1997 was an increase in her L spine\par        no change at the hip\par        statistically significant left forearm\par        11/2017 \par        lumbar spine vertebrae are statistically ineligible and cannot be assessed\par        hip T score -2.3\par        femoral neck T score -3.2\par        left forearm T score -2.9\par        hip no change\par        wrist decrease by 6.7% c/w 1997, no change c/w 2015\par        has a healthy diet, has no coffe but dark chocolate\par        Calcium and Vit D3 does not know the dose 600mg-400 IU once a day\par        was also on Femara \par        also on MVI.

## 2023-01-04 ENCOUNTER — NON-APPOINTMENT (OUTPATIENT)
Age: 88
End: 2023-01-04

## 2023-02-07 ENCOUNTER — RX RENEWAL (OUTPATIENT)
Age: 88
End: 2023-02-07

## 2023-03-06 ENCOUNTER — NON-APPOINTMENT (OUTPATIENT)
Age: 88
End: 2023-03-06

## 2023-03-06 ENCOUNTER — APPOINTMENT (OUTPATIENT)
Dept: CARDIOLOGY | Facility: CLINIC | Age: 88
End: 2023-03-06
Payer: MEDICARE

## 2023-03-06 VITALS
BODY MASS INDEX: 19.02 KG/M2 | SYSTOLIC BLOOD PRESSURE: 128 MMHG | HEIGHT: 62.5 IN | TEMPERATURE: 98.2 F | OXYGEN SATURATION: 98 % | DIASTOLIC BLOOD PRESSURE: 64 MMHG | WEIGHT: 106 LBS | RESPIRATION RATE: 16 BRPM | HEART RATE: 77 BPM

## 2023-03-06 DIAGNOSIS — U07.1 COVID-19: ICD-10-CM

## 2023-03-06 PROCEDURE — 93000 ELECTROCARDIOGRAM COMPLETE: CPT

## 2023-03-06 PROCEDURE — 99215 OFFICE O/P EST HI 40 MIN: CPT | Mod: 25

## 2023-03-06 NOTE — HISTORY OF PRESENT ILLNESS
[FreeTextEntry1] : This 89 year-old female patient presents for cardiovascular evaluation.\par \par Her problem list is as noted above.\par \par Since her last examination approximately 10 months ago she reports some medical interactions.\par \par She has seen her primary care physician in fact had laboratories today.  I advised her I would like a copy of the report.  I also mentioned this to her neighbor who assists with her care.\par \par She reports she did have COVID earlier this year.  Thankfully this was mild.\par \par She has had follow-up with her endocrinologist.\par \par She has tried periodically to use her exercise bicycle at home.  She does have significant limitations due to arthritis.\par \par There are no acute symptoms of chest discomfort, shortness of breath, palpitation, lightheadedness, fainting.\par

## 2023-03-06 NOTE — REASON FOR VISIT
[FreeTextEntry1] : Ms. ROSEY RODGERS has the following problem list:\par \par Cardiac arrhythmia/ implanted loop monitoring (disconnected).\par Valve abnormalities\par Cerebrovascular events/cognitive impairment\par Dyslipidemia\par \par She has additional medical problems as noted.\par This includes memory impairment.\par \par Her primary care physician is Dr. Vann\par \par The patient's daughter Jackie who usually assists with the history could not attend today's visit.  She presents with a neighbor.\par

## 2023-03-06 NOTE — DISCUSSION/SUMMARY
[FreeTextEntry1] : Brief recommendations and follow-up: (see above for details)\par \par The patient overall seems well compensated but there are number of loose ends that could not be reconciled at today's visit.\par The patient does have some cognitive impairment as known.\par I have again recommended empirical aspirin therapy 81 mg daily for her cerebrovascular disease and cardiovascular risk factors.\par We have previously recommended statin therapy however it is not at all clear whether she is taking this.\par A medication list will be brought to our office for review.\par Laboratories have been requested and the patient's neighbor/aide will help coordinate.\par Next recommended visit 1 year.\par Today's visit 45 minutes.\par I advised the patient and aide to keep an accurate list of her medication list on her person at all times.

## 2023-03-06 NOTE — PHYSICAL EXAM
[de-identified] : Kyphoscoliosis. [de-identified] : Using a cane.  Multijoint arthritis. [de-identified] : Trace edema. [de-identified] : Memory impairment

## 2023-03-06 NOTE — CARDIOLOGY SUMMARY
[de-identified] : Holter 3/8/17. Sinus rhythm. Rare VPC. Occasional APC. 2 short, less than 4 beat, atrial  runs. No diary entries.\par  [de-identified] : Stress test 5/21/2010. Normal. 6 minutes. Beny stage II.\par  [de-identified] : Echo 9/9/2021.  Normal LV function.  EF 70%.  Mild aortic sclerosis.  Mild TR.  Normal PA.\par \par Echo 1/2/17. Normal LV function. EF 65%. Trivial aortic sclerosis. Trace AI. Trace MR. Mild  TR. PA 27-32, normal.\par  [de-identified] : Carotid duplex 10/22/18. No hemodynamically significant stenosis.\par  [de-identified] :   Brain MRI. 2/23/17. Multiple foci of small vessel ischemia/infarction.

## 2023-03-06 NOTE — REVIEW OF SYSTEMS
[FreeTextEntry3] : History of right cataract surgery. [FreeTextEntry5] : See HPI. [FreeTextEntry7] : She has been prone to constipation but improved. [FreeTextEntry8] : She reports nocturia x2. [FreeTextEntry9] : Multijoint arthritis.  Chronic right knee discomfort.  Chronic right shoulder injury undergoing physical therapy. [de-identified] : She has memory loss.

## 2023-03-06 NOTE — ASSESSMENT
[FreeTextEntry1] : EKG 3/6/2023.  Sinus bradycardia.  APC.  No acute features.\par EKG 6/27/2022.  Sinus bradycardia, heart rate 47 otherwise within normal limits.\par EKG 6/22/2021.  Sinus bradycardia.  Heart rate 55.  Otherwise within normal limits.\par EKG 6/19/2020.  Sinus bradycardia.  Heart rate 55.  Otherwise within normal limits.\par EKG 10/15/2019.  Sinus bradycardia.  Heart rate 52.  Otherwise within normal limits.\par EKG in 12/26/18. Sinus bradycardia. Heart rate 55. Otherwise within normal limits.

## 2023-03-07 RX ORDER — ZOLEDRONIC ACID 5 MG/100ML
5 INJECTION, SOLUTION INTRAVENOUS
Refills: 0 | Status: DISCONTINUED | COMMUNITY
End: 2023-03-07

## 2023-05-17 ENCOUNTER — RX RENEWAL (OUTPATIENT)
Age: 88
End: 2023-05-17

## 2023-05-25 ENCOUNTER — LABORATORY RESULT (OUTPATIENT)
Age: 88
End: 2023-05-25

## 2023-05-25 ENCOUNTER — APPOINTMENT (OUTPATIENT)
Dept: INTERNAL MEDICINE | Facility: CLINIC | Age: 88
End: 2023-05-25
Payer: MEDICARE

## 2023-05-25 PROCEDURE — 36415 COLL VENOUS BLD VENIPUNCTURE: CPT

## 2023-06-09 ENCOUNTER — APPOINTMENT (OUTPATIENT)
Dept: ENDOCRINOLOGY | Facility: CLINIC | Age: 88
End: 2023-06-09
Payer: MEDICARE

## 2023-06-09 VITALS
DIASTOLIC BLOOD PRESSURE: 78 MMHG | WEIGHT: 108 LBS | SYSTOLIC BLOOD PRESSURE: 110 MMHG | OXYGEN SATURATION: 98 % | HEIGHT: 62.5 IN | HEART RATE: 57 BPM | BODY MASS INDEX: 19.38 KG/M2

## 2023-06-09 LAB
24R-OH-CALCIDIOL SERPL-MCNC: 68.5 PG/ML
25(OH)D3 SERPL-MCNC: 46.6 NG/ML
ALBUMIN MFR SERPL ELPH: 62 %
ALBUMIN SERPL ELPH-MCNC: 4.3 G/DL
ALBUMIN SERPL-MCNC: 3.9 G/DL
ALBUMIN/GLOB SERPL: 1.6 RATIO
ALP BLD-CCNC: 75 U/L
ALPHA1 GLOB MFR SERPL ELPH: 5.2 %
ALPHA1 GLOB SERPL ELPH-MCNC: 0.3 G/DL
ALPHA2 GLOB MFR SERPL ELPH: 12.4 %
ALPHA2 GLOB SERPL ELPH-MCNC: 0.8 G/DL
ALT SERPL-CCNC: 21 U/L
ANION GAP SERPL CALC-SCNC: 10 MMOL/L
AST SERPL-CCNC: 27 U/L
B-GLOBULIN MFR SERPL ELPH: 11.4 %
B-GLOBULIN SERPL ELPH-MCNC: 0.7 G/DL
BILIRUB SERPL-MCNC: 0.4 MG/DL
BUN SERPL-MCNC: 26 MG/DL
CALCIUM SERPL-MCNC: 9.5 MG/DL
CALCIUM SERPL-MCNC: 9.5 MG/DL
CHLORIDE SERPL-SCNC: 104 MMOL/L
CO2 SERPL-SCNC: 26 MMOL/L
CREAT SERPL-MCNC: 0.82 MG/DL
EGFR: 68 ML/MIN/1.73M2
GAMMA GLOB FLD ELPH-MCNC: 0.6 G/DL
GAMMA GLOB MFR SERPL ELPH: 9 %
GLUCOSE SERPL-MCNC: 64 MG/DL
INTERPRETATION SERPL IEP-IMP: NORMAL
MAGNESIUM SERPL-MCNC: 2.1 MG/DL
PARATHYROID HORMONE INTACT: 42 PG/ML
PHOSPHATE SERPL-MCNC: 3.8 MG/DL
POTASSIUM SERPL-SCNC: 4.4 MMOL/L
PROT SERPL-MCNC: 6.3 G/DL
SODIUM SERPL-SCNC: 140 MMOL/L
TSH SERPL-ACNC: 1.4 UIU/ML

## 2023-06-09 PROCEDURE — 99215 OFFICE O/P EST HI 40 MIN: CPT | Mod: 25

## 2023-06-09 PROCEDURE — G0444 DEPRESSION SCREEN ANNUAL: CPT | Mod: 59

## 2023-06-09 RX ORDER — VITAMIN B COMPLEX
CAPSULE ORAL DAILY
Refills: 0 | Status: DISCONTINUED | COMMUNITY
Start: 2021-06-22 | End: 2023-06-09

## 2023-06-09 RX ORDER — ASPIRIN 81 MG/1
81 TABLET ORAL DAILY
Qty: 90 | Refills: 3 | Status: DISCONTINUED | COMMUNITY
Start: 2022-06-27 | End: 2023-06-09

## 2023-06-29 NOTE — ADDENDUM
[FreeTextEntry1] : Comprehensive Metabolic Panel	[id number] 10:27AM	JORDANA STACK\par \par Test Name	Result	Flag	Reference\par Sodium	140 mmol/L		135-145\par Potassium	4.4 mmol/L		3.5-5.3\par Chloride	104 mmol/L		\par CO2	26 mmol/L		22-31\par Anion Gap, Serum	10 mmol/L		5-17\par Glucose	64 mg/dL	L	70-99\par BUN	26 mg/dL	H	7-23\par Creatinine	0.82 mg/dL		0.50-1.30\par Total Protein	6.3 g/dL		6.0-8.3\par Albumin	4.3 g/dL		3.3-5.0\par Calcium, Serum	9.5 mg/dL		8.4-10.5\par Total  Bilirubin	0.4 mg/dL		0.2-1.2\par AST (SGOT)	27 U/L		10-40\par ALT(SGPT)	21 U/L		10-45\par ALK PHOS	75 U/L		\par eGFR	68 mL/min/1.73m2		>=60\par The estimated glomerular filtration rate (eGFR) is calculated using the 2021 CKD-EPI creatinine equation, which does not have a coefficient for race and is validated in individuals 18 years of age and older (N Engl J Med 2021; 385:3206-4828). Creatinine-based eGFR may be inaccurate in various situations including but not limited to extremes of muscle mass, altered dietary protein intake, or medications that affect renal tubular creatinine secretion.\par \par Magnesium, Serum	[id number] 10:27AM	JORDANA STACK\par \par Test Name	Result	Flag	Reference\par Magnesium, Serum	2.1 mg/dL		1.6-2.6\par \par Parathyroid Hormone Intact, Serum	[id number] 10:27AM	JORDANA STACK\par \par Test Name	Result	Flag	Reference\par Calcium.	9.5 mg/dL		8.4-10.5\par Intact PTH	42 pg/mL		15-65\par PTH METHOD: Roche     \par Guide for Interpretation of PTH and Calcium Results   \par       \par  Calcium     PTH   \par  MG/DL       PG/ML \par Normal   8.4-10.5    15-65 \par       \par Primary      \par Hyperparathyroidism      >10.5       >50  \par       \par Non-PTH Hypercalcemia    >10.5       0-20  \par       \par Hypoparathyroidism       <8.40-20  \par     \par Pseudohypoparathyroid    <8.4>50  \par This is intended as a guide only. Factors such as sunlight exposure,\par Vitamin D status and renal function should be evaluated along with clinical presentation.\par \par Phosphorus, Serum	[id number] 10:27AM	JORDANA STACK\par \par Test Name	Result	Flag	Reference\par Phosphorous	3.8 mg/dL		2.5-4.5\par \par Protein Electrophoresis, Serum	[id number] 10:27AM	JORDANA STACK\par \par Test Name	Result	Flag	Reference\par Total Protein	6.3 g/dL		6.0-8.3\par T Protein, Serum	6.3 g/dL		6.0-8.3\par Albumin, Serum	3.9 g/dL		3.6-5.5\par Albumin/Globulin Ratio	1.6 Ratio		\par Alpha 1	0.3 g/dL		0.1-0.4\par Alpha 2	0.8 g/dL		0.5-1.0\par Beta	0.7 g/dL		0.5-1.0\par Gamma	0.6 g/dL		0.6-1.6\par %ALBSPE	62.0 %		\par %[id number]	5.2 %		\par %[id number]	12.4 %		\par %BETASPE	11.4 %		\par %GAMMASPE	9.0 %		\par SPEINTERP			\par Normal Electrophoresis Pattern\par \par Thyroid Stimulating Hormone, Serum	[id number] 10:27AM	JORDANA STACK\par \par Test Name	Result	Flag	Reference\par TSH	1.40 uIU/mL		0.27-4.20\par \par Vitamin D, 1, 25-Dihydroxy	[id number] 10:27AM	JORDANA STACK\par \par Test Name	Result	Flag	Reference\par Vitamin D 1, 25 Dihydroxy	68.5 pg/mL		19.9-79.3\par \par Vitamin D, 25-Hydroxy	[id number] 10:27AM	JORDANA STACK\par \par Test Name	Result	Flag	Reference\par Vitamin D 25 Hydroxy	46.6 ng/mL		30.0-80.0\par Deficiency: Less than 20 ng/mL\par \par Insufficiency: 20-29 ng/mL\par \par Optimum Level: 30-80 ng/mL\par \par Possible Toxicity: Greater than 150 ng/mL\par \par The optimal level for 25-hydroxyvitamin D is based on the 2011 Endocrine Society Clinical Practice Guideline (The Journal of Clinical Endocrinology and Metabolism, Volume 96, Issue 7, Pages 1276-3667, https://doi.org/10.1210/shaw.1418-4594 )\par \par Note: This assay quantifies the sum of both 25-hydroxyvitamin D2 and 25-hydroxyvitamin D3. Result variation may be observed with other methods due to lack of standardization.\par \par Immunofixation, Serum	[id number] 10:27AM	JORDANA STACK\par \par Test Name	Result	Flag	Reference\par Immunofixation			\par No Monoclonal Band Identified\par \par Reference Range: None Detected\par \par Collagen Type I C-Telopeptide	[id number] 10:27AM	JORDANA STACK\par \par Test Name	Result	Flag	Reference\par Collagen Type I C-Telopeptide	70 pg/mL		\par Reference Range:\par Premenopausal Women: 34 - 635\par Postmenopausal Women: 34 - 1037\par Performed At: [facility]\par [address] [place], [place] [id number]\par Fidel GÓMEZ MD Ph:[phone]\par \par Bone Density Report	[id number] 10:42AM	JORDANA STACK\par \par Test Name	Result	Flag	Reference\par Bone Density Report	(Report)		\par [facility] \par                      [address] \par                   [place], [place] [address] \par                     Department of Radiology \par                       [phone] \par  \par  \par Patient Name:   ROSEY RODGERS        Location:    [facility]       \par [facility] Rec #:    [id number]          Account #:   [id number]   \par YOB: 1933          Ordering:    Jordana Stack MD \par Age: 89        Sex:  F         Attending:   Jordana Stack MD \par PCP:    Raman Vann MD \par ______________________________________________________________________________________ \par                                            \par Exam Date:   12/29/22                               \par Exam:     [facility] BONE DENSITY AXIAL(COMMON)                             \par Order#:    [facility] [id number]                              \par        \par  \par  \par CLINICAL HISTORY: 89 years. Female.AGE-RELATED OSTEOPOROSIS. \par   \par  PRIOR DEXA: 12/20/2021, 12/07/2020, 12/05/2019, 11/27/2017, 8/1/2013, 2/28/2011, 11/12/2007, \par 11/15/2006, 12/12/1997 \par   \par  TECHNIQUE: Dual x-ray absorptiometry (DEXA) Measurements of bone density were made of: \par  Left Femur \par  Left Distal Radius \par  An estimate of 10 year fracture risk was made using the World Health Organization fracture risk \par assessment tool. \par   \par  FINDINGS:  \par   \par  LEFT HIP: \par  FEMORAL NECK (LEFT) \par  BMD   0.489  g/sq. cm.   \par  T-Score -3.2 SD from the mean  \par  Z-score -0.7 SD from the mean \par   \par  LEFT HIP (TOTAL) \par  BMD  0.576  g/sq. cm.   \par  T-Score -3.0 SD from the mean \par  Z-score -0.7 SD from the mean \par  Classification: Osteoporosis  \par  BMD change versus previous: -4.1% \par   \par  LEFT FOREARM (TOTAL) \par  BMD  0.544  g/sq. cm.   \par  T-Score -2.5 SD from the mean  \par  Z-score N/A SD from the mean \par  Classification Osteoporosis  \par  BMD change versus previous: 5.2% \par   \par  10-YEAR FRACTURE RISK \par  FRAX (WHO fracture risk assessment tool) \par  Major osteoporotic fracture: Not reported \par   \par  (Note: FRAX is not reported if some T-scores for Spine Total or Hip Total or Femoral Neck at or \par below -2.5) \par   \par  If the lowest T-score is -2.5 or below, the bone density meets the World Health Organization's \par (WHO) criteria for osteoporosis at one or more sites. \par   \par   \par  Impression: DEXA SCAN RESULTS INDICATED IN FINDINGS \par   \par   \par  Legend: \par  BMD = Bone mineral density \par  T-Score = Variance from a young adult population matched for gender and ethnicity \par  ([employer] C) \par   \par  --- End of Report --- \par   \par ***Electronically Signed *** \par ----------------------------------------------- \par Isaac Santos MD       01/04/23 1605 \par  \par Dictated on 01/04/23 \par  \par  \par Report cc: Jordana Stack MD;\par \par \par

## 2023-06-29 NOTE — ADDENDUM
[FreeTextEntry1] : Comprehensive Metabolic Panel	[id number] 10:27AM	JORDANA STACK\par \par Test Name	Result	Flag	Reference\par Sodium	140 mmol/L		135-145\par Potassium	4.4 mmol/L		3.5-5.3\par Chloride	104 mmol/L		\par CO2	26 mmol/L		22-31\par Anion Gap, Serum	10 mmol/L		5-17\par Glucose	64 mg/dL	L	70-99\par BUN	26 mg/dL	H	7-23\par Creatinine	0.82 mg/dL		0.50-1.30\par Total Protein	6.3 g/dL		6.0-8.3\par Albumin	4.3 g/dL		3.3-5.0\par Calcium, Serum	9.5 mg/dL		8.4-10.5\par Total  Bilirubin	0.4 mg/dL		0.2-1.2\par AST (SGOT)	27 U/L		10-40\par ALT(SGPT)	21 U/L		10-45\par ALK PHOS	75 U/L		\par eGFR	68 mL/min/1.73m2		>=60\par The estimated glomerular filtration rate (eGFR) is calculated using the 2021 CKD-EPI creatinine equation, which does not have a coefficient for race and is validated in individuals 18 years of age and older (N Engl J Med 2021; 385:8718-6250). Creatinine-based eGFR may be inaccurate in various situations including but not limited to extremes of muscle mass, altered dietary protein intake, or medications that affect renal tubular creatinine secretion.\par \par Magnesium, Serum	[id number] 10:27AM	JORDANA STACK\par \par Test Name	Result	Flag	Reference\par Magnesium, Serum	2.1 mg/dL		1.6-2.6\par \par Parathyroid Hormone Intact, Serum	[id number] 10:27AM	JORDANA STACK\par \par Test Name	Result	Flag	Reference\par Calcium.	9.5 mg/dL		8.4-10.5\par Intact PTH	42 pg/mL		15-65\par PTH METHOD: Roche     \par Guide for Interpretation of PTH and Calcium Results   \par       \par  Calcium     PTH   \par  MG/DL       PG/ML \par Normal   8.4-10.5    15-65 \par       \par Primary      \par Hyperparathyroidism      >10.5       >50  \par       \par Non-PTH Hypercalcemia    >10.5       0-20  \par       \par Hypoparathyroidism       <8.40-20  \par     \par Pseudohypoparathyroid    <8.4>50  \par This is intended as a guide only. Factors such as sunlight exposure,\par Vitamin D status and renal function should be evaluated along with clinical presentation.\par \par Phosphorus, Serum	[id number] 10:27AM	JORDANA STACK\par \par Test Name	Result	Flag	Reference\par Phosphorous	3.8 mg/dL		2.5-4.5\par \par Protein Electrophoresis, Serum	[id number] 10:27AM	JORDANA STACK\par \par Test Name	Result	Flag	Reference\par Total Protein	6.3 g/dL		6.0-8.3\par T Protein, Serum	6.3 g/dL		6.0-8.3\par Albumin, Serum	3.9 g/dL		3.6-5.5\par Albumin/Globulin Ratio	1.6 Ratio		\par Alpha 1	0.3 g/dL		0.1-0.4\par Alpha 2	0.8 g/dL		0.5-1.0\par Beta	0.7 g/dL		0.5-1.0\par Gamma	0.6 g/dL		0.6-1.6\par %ALBSPE	62.0 %		\par %[id number]	5.2 %		\par %[id number]	12.4 %		\par %BETASPE	11.4 %		\par %GAMMASPE	9.0 %		\par SPEINTERP			\par Normal Electrophoresis Pattern\par \par Thyroid Stimulating Hormone, Serum	[id number] 10:27AM	JORDANA STACK\par \par Test Name	Result	Flag	Reference\par TSH	1.40 uIU/mL		0.27-4.20\par \par Vitamin D, 1, 25-Dihydroxy	[id number] 10:27AM	JORDANA STACK\par \par Test Name	Result	Flag	Reference\par Vitamin D 1, 25 Dihydroxy	68.5 pg/mL		19.9-79.3\par \par Vitamin D, 25-Hydroxy	[id number] 10:27AM	JORDANA STACK\par \par Test Name	Result	Flag	Reference\par Vitamin D 25 Hydroxy	46.6 ng/mL		30.0-80.0\par Deficiency: Less than 20 ng/mL\par \par Insufficiency: 20-29 ng/mL\par \par Optimum Level: 30-80 ng/mL\par \par Possible Toxicity: Greater than 150 ng/mL\par \par The optimal level for 25-hydroxyvitamin D is based on the 2011 Endocrine Society Clinical Practice Guideline (The Journal of Clinical Endocrinology and Metabolism, Volume 96, Issue 7, Pages 1328-5311, https://doi.org/10.1210/shaw.9135-2041 )\par \par Note: This assay quantifies the sum of both 25-hydroxyvitamin D2 and 25-hydroxyvitamin D3. Result variation may be observed with other methods due to lack of standardization.\par \par Immunofixation, Serum	[id number] 10:27AM	JORDANA STACK\par \par Test Name	Result	Flag	Reference\par Immunofixation			\par No Monoclonal Band Identified\par \par Reference Range: None Detected\par \par Collagen Type I C-Telopeptide	[id number] 10:27AM	JORDANA STACK\par \par Test Name	Result	Flag	Reference\par Collagen Type I C-Telopeptide	70 pg/mL		\par Reference Range:\par Premenopausal Women: 34 - 635\par Postmenopausal Women: 34 - 1037\par Performed At: [facility]\par [address] [place], [place] [id number]\par Fidel GÓMEZ MD Ph:[phone]\par \par Bone Density Report	[id number] 10:42AM	JORDANA STACK\par \par Test Name	Result	Flag	Reference\par Bone Density Report	(Report)		\par [facility] \par                      [address] \par                   [place], [place] [address] \par                     Department of Radiology \par                       [phone] \par  \par  \par Patient Name:   ROSEY RODGERS        Location:    [facility]       \par [facility] Rec #:    [id number]          Account #:   [id number]   \par YOB: 1933          Ordering:    Jordana Stack MD \par Age: 89        Sex:  F         Attending:   Jordana Stack MD \par PCP:    Raman Vann MD \par ______________________________________________________________________________________ \par                                            \par Exam Date:   12/29/22                               \par Exam:     [facility] BONE DENSITY AXIAL(COMMON)                             \par Order#:    [facility] [id number]                              \par        \par  \par  \par CLINICAL HISTORY: 89 years. Female.AGE-RELATED OSTEOPOROSIS. \par   \par  PRIOR DEXA: 12/20/2021, 12/07/2020, 12/05/2019, 11/27/2017, 8/1/2013, 2/28/2011, 11/12/2007, \par 11/15/2006, 12/12/1997 \par   \par  TECHNIQUE: Dual x-ray absorptiometry (DEXA) Measurements of bone density were made of: \par  Left Femur \par  Left Distal Radius \par  An estimate of 10 year fracture risk was made using the World Health Organization fracture risk \par assessment tool. \par   \par  FINDINGS:  \par   \par  LEFT HIP: \par  FEMORAL NECK (LEFT) \par  BMD   0.489  g/sq. cm.   \par  T-Score -3.2 SD from the mean  \par  Z-score -0.7 SD from the mean \par   \par  LEFT HIP (TOTAL) \par  BMD  0.576  g/sq. cm.   \par  T-Score -3.0 SD from the mean \par  Z-score -0.7 SD from the mean \par  Classification: Osteoporosis  \par  BMD change versus previous: -4.1% \par   \par  LEFT FOREARM (TOTAL) \par  BMD  0.544  g/sq. cm.   \par  T-Score -2.5 SD from the mean  \par  Z-score N/A SD from the mean \par  Classification Osteoporosis  \par  BMD change versus previous: 5.2% \par   \par  10-YEAR FRACTURE RISK \par  FRAX (WHO fracture risk assessment tool) \par  Major osteoporotic fracture: Not reported \par   \par  (Note: FRAX is not reported if some T-scores for Spine Total or Hip Total or Femoral Neck at or \par below -2.5) \par   \par  If the lowest T-score is -2.5 or below, the bone density meets the World Health Organization's \par (WHO) criteria for osteoporosis at one or more sites. \par   \par   \par  Impression: DEXA SCAN RESULTS INDICATED IN FINDINGS \par   \par   \par  Legend: \par  BMD = Bone mineral density \par  T-Score = Variance from a young adult population matched for gender and ethnicity \par  ([employer] C) \par   \par  --- End of Report --- \par   \par ***Electronically Signed *** \par ----------------------------------------------- \par Isaac Santos MD       01/04/23 1605 \par  \par Dictated on 01/04/23 \par  \par  \par Report cc: Jordana Stack MD;\par \par \par

## 2023-06-29 NOTE — ASSESSMENT
[0] : 2) Feeling down, depressed, or hopeless: Not at all (0) [PHQ-2 Negative - No further assessment needed] : PHQ-2 Negative - No further assessment needed [FreeTextEntry1] : 5 minutes spent on depression screening [[id number]] : 0

## 2023-06-29 NOTE — ASSESSMENT
[0] : 2) Feeling down, depressed, or hopeless: Not at all (0) [PHQ-2 Negative - No further assessment needed] : PHQ-2 Negative - No further assessment needed [FreeTextEntry1] : 5 minutes spent on depression screening [[id number]] : 0

## 2023-07-11 ENCOUNTER — APPOINTMENT (OUTPATIENT)
Dept: CARDIOLOGY | Facility: CLINIC | Age: 88
End: 2023-07-11

## 2023-08-13 NOTE — HISTORY OF PRESENT ILLNESS
[FreeTextEntry1] : Last Reclast 2/7/22 Prolia 2/3/2023  88 yo WM coming for f/u for worsening osteoporosis  today asked me to have her daughter on the phone during the visit , that we did but she did not join to the video call noncompliant patientIn the past, last seen 3/2018 then 11/2019 Reclast 4/2018 and 12/2019, 2/21 2/7/2022 had it four  times  so far with no side effects  pt reports memory problems, seen Neurology tested good per pt 3hrs testing         [facility] ortho specialist -hand              labs reviewed, good , last Vit D with PCP 8/19 good last iPTH good last year        was on Fosamax once a week started 11/2013 stopped 9/2015        pt is a poor historian brought in records today        has h/o breast cancer with bone Rx so not a candidate for forteo        was on femara        notes from Dr Hellerman reviewed        never had fractures        never had kidney stone         sees her dentist twice a year Dr Bailey , I called him and he cleared patient for Reclast see phone notes        "mother of 3 - 2006 required surgery for L breast cancer (Dr. Francisco/[place]) followed by RT and then chemotherapy by Dr. Woodruff including Femara. Subsequently started on Fosamax for a while and then Actonel but eventually switched to calcitonin nasal spray. Takes calcium 500 plus DD 1400 plus multivit. Her dentist, Dr. Zeyad Bailey, in [place] agreed with her regarding a tooth extraction that Fosamax might have played a role. Recently ran out of calcitonin" per Dr Hellerman         8/2013 left hip and spine was osteopenia L spine T score -2.1        left hip was -2.3        left forearm -2.7        c/w 1997 was an increase in her L spine        no change at the hip        statistically significant left forearm        11/2017         lumbar spine vertebrae are statistically ineligible and cannot be assessed        hip T score -2.3        femoral neck T score -3.2        left forearm T score -2.9        hip no change        wrist decrease by 6.7% c/w 1997, no change c/w 2015        has a healthy diet, has no coffe but dark chocolate        Calcium and Vit D3 does not know the dose 600mg-400 IU once a day        was also on Femara         also on MVI.

## 2023-08-13 NOTE — HISTORY OF PRESENT ILLNESS
[FreeTextEntry1] : Last Reclast 2/7/22 Prolia 2/3/2023  89 yo WM coming for f/u for worsening osteoporosis  today asked me to have her daughter on the phone during the visit , that we did but she did not join to the video call noncompliant patientIn the past, last seen 3/2018 then 11/2019 Reclast 4/2018 and 12/2019, 2/21 2/7/2022 had it four  times  so far with no side effects  pt reports memory problems, seen Neurology tested good per pt 3hrs testing         [facility] ortho specialist -hand              labs reviewed, good , last Vit D with PCP 8/19 good last iPTH good last year        was on Fosamax once a week started 11/2013 stopped 9/2015        pt is a poor historian brought in records today        has h/o breast cancer with bone Rx so not a candidate for forteo        was on femara        notes from Dr Hellerman reviewed        never had fractures        never had kidney stone         sees her dentist twice a year Dr Bailey , I called him and he cleared patient for Reclast see phone notes        "mother of 3 - 2006 required surgery for L breast cancer (Dr. Francisco/[place]) followed by RT and then chemotherapy by Dr. Woodruff including Femara. Subsequently started on Fosamax for a while and then Actonel but eventually switched to calcitonin nasal spray. Takes calcium 500 plus DD 1400 plus multivit. Her dentist, Dr. Zeyad Bailey, in [place] agreed with her regarding a tooth extraction that Fosamax might have played a role. Recently ran out of calcitonin" per Dr Hellerman         8/2013 left hip and spine was osteopenia L spine T score -2.1        left hip was -2.3        left forearm -2.7        c/w 1997 was an increase in her L spine        no change at the hip        statistically significant left forearm        11/2017         lumbar spine vertebrae are statistically ineligible and cannot be assessed        hip T score -2.3        femoral neck T score -3.2        left forearm T score -2.9        hip no change        wrist decrease by 6.7% c/w 1997, no change c/w 2015        has a healthy diet, has no coffe but dark chocolate        Calcium and Vit D3 does not know the dose 600mg-400 IU once a day        was also on Femara         also on MVI.

## 2023-12-13 ENCOUNTER — APPOINTMENT (OUTPATIENT)
Dept: ENDOCRINOLOGY | Facility: CLINIC | Age: 88
End: 2023-12-13

## 2023-12-26 ENCOUNTER — APPOINTMENT (OUTPATIENT)
Dept: ENDOCRINOLOGY | Facility: CLINIC | Age: 88
End: 2023-12-26
Payer: MEDICARE

## 2023-12-26 DIAGNOSIS — K52.9 NONINFECTIVE GASTROENTERITIS AND COLITIS, UNSPECIFIED: ICD-10-CM

## 2023-12-26 DIAGNOSIS — M81.0 AGE-RELATED OSTEOPOROSIS W/OUT CURRENT PATHOLOGICAL FRACTURE: ICD-10-CM

## 2023-12-26 DIAGNOSIS — E01.0 IODINE-DEFICIENCY RELATED DIFFUSE (ENDEMIC) GOITER: ICD-10-CM

## 2023-12-26 LAB
24R-OH-CALCIDIOL SERPL-MCNC: 56.4 PG/ML
25(OH)D3 SERPL-MCNC: 45.2 NG/ML
ALBUMIN SERPL ELPH-MCNC: 4.2 G/DL
ALP BLD-CCNC: 77 U/L
ALT SERPL-CCNC: 22 U/L
ANION GAP SERPL CALC-SCNC: 9 MMOL/L
AST SERPL-CCNC: 26 U/L
BILIRUB SERPL-MCNC: 0.4 MG/DL
BUN SERPL-MCNC: 23 MG/DL
CALCIUM SERPL-MCNC: 9.4 MG/DL
CALCIUM SERPL-MCNC: 9.4 MG/DL
CHLORIDE SERPL-SCNC: 105 MMOL/L
CO2 SERPL-SCNC: 29 MMOL/L
CREAT SERPL-MCNC: 0.95 MG/DL
EGFR: 57 ML/MIN/1.73M2
GLUCOSE SERPL-MCNC: 76 MG/DL
MAGNESIUM SERPL-MCNC: 2.1 MG/DL
PARATHYROID HORMONE INTACT: 27 PG/ML
PHOSPHATE SERPL-MCNC: 3.9 MG/DL
POTASSIUM SERPL-SCNC: 4.4 MMOL/L
PROT SERPL-MCNC: 6.2 G/DL
SODIUM SERPL-SCNC: 142 MMOL/L
TSH SERPL-ACNC: 1.66 UIU/ML

## 2023-12-26 PROCEDURE — 99443: CPT

## 2023-12-26 NOTE — ASSESSMENT
[0] : 2) Feeling down, depressed, or hopeless: Not at all (0) [PHQ-2 Negative - No further assessment needed] : PHQ-2 Negative - No further assessment needed [[id number]] : 0

## 2023-12-26 NOTE — PHYSICAL EXAM
[Alert] : alert [No Acute Distress] : no acute distress [Normal Hearing] : hearing was normal [No Respiratory Distress] : no respiratory distress [Oriented x3] : oriented to person, place, and time [Normal Affect] : the affect was normal [Normal Mood] : the mood was normal

## 2023-12-26 NOTE — HISTORY OF PRESENT ILLNESS
[Home] : at home, [unfilled] , at the time of the visit. [Medical Office: ([facility])___] : at the medical office located in  [Verbal consent obtained from patient] : the patient, [unfilled] [FreeTextEntry1] : Last Prolia 08/07/23, , Prolia 2/3/23, Reclast 2/7/22  89 yo WM coming for f/u for worsening osteoporosis  today asked me to have her daughter on the phone during the visit , that we did but she did not join to the video call noncompliant patient, last seen 3/2018 then 11/2019 Reclast 4/2018 and 12/2019, 2/21 2/7/2022  had it four  times  so far with no side effects  pt reports memory problems, seen Neurology tested good per pt 3hrs testing         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 Francisco/[place]) followed by RT and then chemotherapy by Dr. Woodruff including Femara. Subsequently started on Fosamax for a while and then Actonel but eventually switched to calcitonin nasal spray. Takes calcium 500 plus DD 1400 plus multivit. Her dentist, Dr. Zeyad Bailey, in [place] agreed with her regarding a tooth extraction that Fosamax might have played a role. Recently ran out of calcitonin" per Dr Hellerman         8/2013 left hip and spine was osteopenia L spine T score -2.1        left hip was -2.3        left forearm -2.7        c/w 1997 was an increase in her L spine        no change at the hip        statistically significant left forearm        11/2017         lumbar spine vertebrae are statistically ineligible and cannot be assessed        hip T score -2.3        femoral neck T score -3.2        left forearm T score -2.9        hip no change        wrist decrease by 6.7% c/w 1997, no change c/w 2015        has a healthy diet, has no coffe but dark chocolate        Calcium and Vit D3 does not know the dose 600mg-400 IU once a day        was also on Femara         also on MVI.  12/26/23 Son and aide helpign to provide history. Lauren says she has been eating well and seems to be stable. He takes her on walks and she also goes on the exercise bike.   She is still able to walk on her own and did a 1/2 mile walk yesterday. She had some knee pain but she was still able to do it.  No falls lately, but would like her to use the cane more. She has not been using it around the house.  Taking Calcium supplement 1200 mg TID (3600 mg per 24 hr). Supplement includes vitamin D 5,000 IU TID.    Last Prolia in 8/7/2023. Needs next Prolia in Feb 2024.  Diarrhea has resolved. Bowel movements have been normal.

## 2023-12-26 NOTE — ADDENDUM
[FreeTextEntry1] : Comprehensive Metabolic Panel	[id number] 10:27AM	JORDANA STACK\par  \par  Test Name	Result	Flag	Reference\par  Sodium	140 mmol/L		135-145\par  Potassium	4.4 mmol/L		3.5-5.3\par  Chloride	104 mmol/L		\par  CO2	26 mmol/L		22-31\par  Anion Gap, Serum	10 mmol/L		5-17\par  Glucose	64 mg/dL	L	70-99\par  BUN	26 mg/dL	H	7-23\par  Creatinine	0.82 mg/dL		0.50-1.30\par  Total Protein	6.3 g/dL		6.0-8.3\par  Albumin	4.3 g/dL		3.3-5.0\par  Calcium, Serum	9.5 mg/dL		8.4-10.5\par  Total  Bilirubin	0.4 mg/dL		0.2-1.2\par  AST (SGOT)	27 U/L		10-40\par  ALT(SGPT)	21 U/L		10-45\par  ALK PHOS	75 U/L		\par  eGFR	68 mL/min/1.73m2		>=60\par  The estimated glomerular filtration rate (eGFR) is calculated using the 2021 CKD-EPI creatinine equation, which does not have a coefficient for race and is validated in individuals 18 years of age and older (N Engl J Med 2021; 385:4521-5431). Creatinine-based eGFR may be inaccurate in various situations including but not limited to extremes of muscle mass, altered dietary protein intake, or medications that affect renal tubular creatinine secretion.\par  \par  Magnesium, Serum	[id number] 10:27AM	JORDANA STACK\par  \par  Test Name	Result	Flag	Reference\par  Magnesium, Serum	2.1 mg/dL		1.6-2.6\par  \par  Parathyroid Hormone Intact, Serum	[id number] 10:27AM	JORDANA STACK\par  \par  Test Name	Result	Flag	Reference\par  Calcium.	9.5 mg/dL		8.4-10.5\par  Intact PTH	42 pg/mL		15-65\par  PTH METHOD: Roche     \par  Guide for Interpretation of PTH and Calcium Results   \par        \par   Calcium     PTH   \par   MG/DL       PG/ML \par  Normal   8.4-10.5    15-65 \par        \par  Primary      \par  Hyperparathyroidism      >10.5       >50  \par        \par  Non-PTH Hypercalcemia    >10.5       0-20  \par        \par  Hypoparathyroidism       <8.40-20  \par      \par  Pseudohypoparathyroid    <8.4>50  \par  This is intended as a guide only. Factors such as sunlight exposure,\par  Vitamin D status and renal function should be evaluated along with clinical presentation.\par  \par  Phosphorus, Serum	[id number] 10:27AM	JORDANA STACK\par  \par  Test Name	Result	Flag	Reference\par  Phosphorous	3.8 mg/dL		2.5-4.5\par  \par  Protein Electrophoresis, Serum	[id number] 10:27AM	JORDANA STACK\par  \par  Test Name	Result	Flag	Reference\par  Total Protein	6.3 g/dL		6.0-8.3\par  T Protein, Serum	6.3 g/dL		6.0-8.3\par  Albumin, Serum	3.9 g/dL		3.6-5.5\par  Albumin/Globulin Ratio	1.6 Ratio		\par  Alpha 1	0.3 g/dL		0.1-0.4\par  Alpha 2	0.8 g/dL		0.5-1.0\par  Beta	0.7 g/dL		0.5-1.0\par  Gamma	0.6 g/dL		0.6-1.6\par  %ALBSPE	62.0 %		\par  %RUSRP9HEN	5.2 %		\par  %XTZZU4LVF	12.4 %		\par  %BETASPE	11.4 %		\par  %GAMMASPE	9.0 %		\par  SPEINTERP			\par  Normal Electrophoresis Pattern\par  \par  Thyroid Stimulating Hormone, Serum	[id number] 10:27AM	JORDANA STACK\par  \par  Test Name	Result	Flag	Reference\par  TSH	1.40 uIU/mL		0.27-4.20\par  \par  Vitamin D, 1, 25-Dihydroxy	[id number] 10:27AM	JORDANA STACK\par  \par  Test Name	Result	Flag	Reference\par  Vitamin D 1, 25 Dihydroxy	68.5 pg/mL		19.9-79.3\par  \par  Vitamin D, 25-Hydroxy	[id number] 10:27AM	JORDANA STACK\par  \par  Test Name	Result	Flag	Reference\par  Vitamin D 25 Hydroxy	46.6 ng/mL		30.0-80.0\par  Deficiency: Less than 20 ng/mL\par  \par  Insufficiency: 20-29 ng/mL\par  \par  Optimum Level: 30-80 ng/mL\par  \par  Possible Toxicity: Greater than 150 ng/mL\par  \par  The optimal level for 25-hydroxyvitamin D is based on the 2011 Endocrine Society Clinical Practice Guideline (The Journal of Clinical Endocrinology and Metabolism, Volume 96, Issue 7, Pages 8100-0969, https://doi.org/10.1210/shaw.5959-5623 )\par  \par  Note: This assay quantifies the sum of both 25-hydroxyvitamin D2 and 25-hydroxyvitamin D3. Result variation may be observed with other methods due to lack of standardization.\par  \par  Immunofixation, Serum	[id number] 10:27AM	JORDANA STACK\par  \par  Test Name	Result	Flag	Reference\par  Immunofixation			\par  No Monoclonal Band Identified\par  \par  Reference Range: None Detected\par  \par  Collagen Type I C-Telopeptide	[id number] 10:27AM	JORDANA STACK\par  \par  Test Name	Result	Flag	Reference\par  Collagen Type I C-Telopeptide	70 pg/mL		\par  Reference Range:\par  Premenopausal Women: 34 - 635\par  Postmenopausal Women: 34 - 1037\par  Performed At: [facility]\par  [address] [place], [place] [id number]\par  Fidel GÓMEZ MD Ph:[phone]\par  \par  Bone Density Report	[id number] 10:42AM	JORDANA STACK\par  \par  Test Name	Result	Flag	Reference\par  Bone Density Report	(Report)		\par  [facility] \par                       [address] \par                    [place], [place] [address] \par                      Department of Radiology \par                        [phone] \par   \par   \par  Patient Name:   ROSEY RODGERS        Location:    [facility]       \par  [facility] Rec #:    [id number]          Account #:   [id number]   \par  YOB: 1933          Ordering:    Jordana Stack MD \par  Age: 89        Sex:  F         Attending:   Jordana Stack MD \par  PCP:    Raman Vann MD \par  ______________________________________________________________________________________ \par                                             \par  Exam Date:   12/29/22                               \par  Exam:     [facility] BONE DENSITY AXIAL(COMMON)                             \par  Order#:    [facility] [id number]                              \par         \par   \par   \par  CLINICAL HISTORY: 89 years. Female.AGE-RELATED OSTEOPOROSIS. \par    \par   PRIOR DEXA: 12/20/2021, 12/07/2020, 12/05/2019, 11/27/2017, 8/1/2013, 2/28/2011, 11/12/2007, \par  11/15/2006, 12/12/1997 \par    \par   TECHNIQUE: Dual x-ray absorptiometry (DEXA) Measurements of bone density were made of: \par   Left Femur \par   Left Distal Radius \par   An estimate of 10 year fracture risk was made using the World Health Organization fracture risk \par  assessment tool. \par    \par   FINDINGS:  \par    \par   LEFT HIP: \par   FEMORAL NECK (LEFT) \par   BMD   0.489  g/sq. cm.   \par   T-Score -3.2 SD from the mean  \par   Z-score -0.7 SD from the mean \par    \par   LEFT HIP (TOTAL) \par   BMD  0.576  g/sq. cm.   \par   T-Score -3.0 SD from the mean \par   Z-score -0.7 SD from the mean \par   Classification: Osteoporosis  \par   BMD change versus previous: -4.1% \par    \par   LEFT FOREARM (TOTAL) \par   BMD  0.544  g/sq. cm.   \par   T-Score -2.5 SD from the mean  \par   Z-score N/A SD from the mean \par   Classification Osteoporosis  \par   BMD change versus previous: 5.2% \par    \par   10-YEAR FRACTURE RISK \par   FRAX (WHO fracture risk assessment tool) \par   Major osteoporotic fracture: Not reported \par    \par   (Note: FRAX is not reported if some T-scores for Spine Total or Hip Total or Femoral Neck at or \par  below -2.5) \par    \par   If the lowest T-score is -2.5 or below, the bone density meets the World Health Organization's \par  (WHO) criteria for osteoporosis at one or more sites. \par    \par    \par   Impression: DEXA SCAN RESULTS INDICATED IN FINDINGS \par    \par    \par   Legend: \par   BMD = Bone mineral density \par   T-Score = Variance from a young adult population matched for gender and ethnicity \par   ([employer]) \par    \par   --- End of Report --- \par    \par  ***Electronically Signed *** \par  ----------------------------------------------- \par  Isaac Santos MD       01/04/23 1605 \par   \par  Dictated on 01/04/23 \par   \par   \par  Report cc: Jordana Stack MD;\par  \par  \par

## 2023-12-26 NOTE — REVIEW OF SYSTEMS
[Joint Pain] : joint pain [Negative] : Heme/Lymph [Diarrhea] : no diarrhea [FreeTextEntry7] : No more diarrhea. [de-identified] : memory loss

## 2024-02-26 ENCOUNTER — RX RENEWAL (OUTPATIENT)
Age: 89
End: 2024-02-26

## 2024-03-11 ENCOUNTER — NON-APPOINTMENT (OUTPATIENT)
Age: 89
End: 2024-03-11

## 2024-03-12 ENCOUNTER — APPOINTMENT (OUTPATIENT)
Dept: CARDIOLOGY | Facility: CLINIC | Age: 89
End: 2024-03-12
Payer: MEDICARE

## 2024-03-12 ENCOUNTER — NON-APPOINTMENT (OUTPATIENT)
Age: 89
End: 2024-03-12

## 2024-03-12 VITALS
BODY MASS INDEX: 20.2 KG/M2 | DIASTOLIC BLOOD PRESSURE: 70 MMHG | HEIGHT: 63 IN | WEIGHT: 114 LBS | SYSTOLIC BLOOD PRESSURE: 122 MMHG

## 2024-03-12 DIAGNOSIS — Z01.89 ENCOUNTER FOR OTHER SPECIFIED SPECIAL EXAMINATIONS: ICD-10-CM

## 2024-03-12 DIAGNOSIS — E78.5 HYPERLIPIDEMIA, UNSPECIFIED: ICD-10-CM

## 2024-03-12 DIAGNOSIS — I49.9 CARDIAC ARRHYTHMIA, UNSPECIFIED: ICD-10-CM

## 2024-03-12 DIAGNOSIS — I67.9 CEREBROVASCULAR DISEASE, UNSPECIFIED: ICD-10-CM

## 2024-03-12 PROCEDURE — 93000 ELECTROCARDIOGRAM COMPLETE: CPT

## 2024-03-12 PROCEDURE — 99214 OFFICE O/P EST MOD 30 MIN: CPT | Mod: 25

## 2024-03-12 RX ORDER — PINDOLOL 5 MG/1
5 TABLET ORAL
Qty: 90 | Refills: 3 | Status: ACTIVE | COMMUNITY
Start: 2019-05-06 | End: 1900-01-01

## 2024-03-12 RX ORDER — AZELASTINE HYDROCHLORIDE 137 UG/1
0.1 SPRAY, METERED NASAL
Qty: 30 | Refills: 0 | Status: DISCONTINUED | COMMUNITY
Start: 2021-06-10 | End: 2024-03-12

## 2024-03-12 RX ORDER — ROSUVASTATIN CALCIUM 5 MG/1
5 TABLET, FILM COATED ORAL
Qty: 90 | Refills: 3 | Status: ACTIVE | COMMUNITY
Start: 2022-08-22 | End: 1900-01-01

## 2024-03-12 NOTE — REVIEW OF SYSTEMS
[FreeTextEntry3] : History of right cataract surgery. [FreeTextEntry5] : See HPI. [FreeTextEntry4] : She has hearing loss. [FreeTextEntry7] : Improved constipation. [FreeTextEntry8] : She reports nocturia x1. [FreeTextEntry9] : Multijoint arthritis.  Chronic right knee discomfort.  Chronic right shoulder injury undergoing physical therapy. [de-identified] : She has memory loss.

## 2024-03-12 NOTE — CARDIOLOGY SUMMARY
[de-identified] : Stress test 5/21/2010. Normal. 6 minutes. Beny stage II.\par   [de-identified] : Holter 3/8/17. Sinus rhythm. Rare VPC. Occasional APC. 2 short, less than 4 beat, atrial  runs. No diary entries.\par   [de-identified] : Echo 9/9/2021.  Normal LV function.  EF 70%.  Mild aortic sclerosis.  Mild TR.  Normal PA.  Echo 1/2/17. Normal LV function. EF 65%. Trivial aortic sclerosis. Trace AI. Trace MR. Mild  TR. PA 27-32, normal.  [de-identified] :   Brain MRI. 2/23/17. Multiple foci of small vessel ischemia/infarction.  [de-identified] : Carotid duplex 10/22/18. No hemodynamically significant stenosis.\par

## 2024-03-12 NOTE — REASON FOR VISIT
[FreeTextEntry1] : Ms. ROSEY RODGERS has the following problem list:  Cardiac arrhythmia/ implanted loop monitoring (disconnected). Valve abnormalities Cerebrovascular events/cognitive impairment Dyslipidemia  She has additional medical problems as noted. This includes memory impairment.  Her primary care physician is Dr. Vann  The patient's daughter Jackie who assists with the history.

## 2024-03-12 NOTE — DISCUSSION/SUMMARY
[FreeTextEntry1] : Brief recommendations and follow-up: (see above for details)  The patient's overall cardiovascular status is well compensated. She does have both functional impairment and memory impairment. Arrhythmias appear to be under good control. Lipids under excellent control. I have again recommended 81 mg aspirin daily. She does have evidence of left ventricular enlargement on her EKG and I do think it prudent that we repeat her echocardiogram.  This will be scheduled. Next routine cardiology visit 1 year.  In view of my half-way this will be with another of my colleagues in the office.  I will miss her and wish her well.

## 2024-03-12 NOTE — PHYSICAL EXAM
[de-identified] : Kyphoscoliosis. [de-identified] : Using a cane.  Multijoint arthritis. [de-identified] : Memory impairment [de-identified] : Trace edema.

## 2024-03-12 NOTE — HISTORY OF PRESENT ILLNESS
[FreeTextEntry1] : This 89 year-old female patient presents for cardiovascular evaluation.  Her problem list is as noted above.  Since her last examination 1 year ago she reports no major events or hospitalizations.  She uses a walker, cane, and enjoys using an exercise bike.  There are no acute symptoms of chest discomfort, shortness of breath, palpitation, lightheadedness, fainting.

## 2024-05-20 ENCOUNTER — APPOINTMENT (OUTPATIENT)
Dept: CARDIOLOGY | Facility: CLINIC | Age: 89
End: 2024-05-20
Payer: MEDICARE

## 2024-05-20 PROCEDURE — 93306 TTE W/DOPPLER COMPLETE: CPT

## 2024-05-21 DIAGNOSIS — Z92.89 PERSONAL HISTORY OF OTHER MEDICAL TREATMENT: ICD-10-CM

## 2024-05-21 DIAGNOSIS — I38 ENDOCARDITIS, VALVE UNSPECIFIED: ICD-10-CM

## 2024-09-05 ENCOUNTER — NON-APPOINTMENT (OUTPATIENT)
Age: 89
End: 2024-09-05

## 2024-09-06 ENCOUNTER — APPOINTMENT (OUTPATIENT)
Dept: ENDOCRINOLOGY | Facility: CLINIC | Age: 89
End: 2024-09-06
Payer: MEDICARE

## 2024-09-06 VITALS
HEIGHT: 63 IN | DIASTOLIC BLOOD PRESSURE: 80 MMHG | OXYGEN SATURATION: 98 % | SYSTOLIC BLOOD PRESSURE: 110 MMHG | WEIGHT: 112 LBS | BODY MASS INDEX: 19.84 KG/M2 | HEART RATE: 53 BPM

## 2024-09-06 DIAGNOSIS — M81.0 AGE-RELATED OSTEOPOROSIS W/OUT CURRENT PATHOLOGICAL FRACTURE: ICD-10-CM

## 2024-09-06 DIAGNOSIS — K52.9 NONINFECTIVE GASTROENTERITIS AND COLITIS, UNSPECIFIED: ICD-10-CM

## 2024-09-06 DIAGNOSIS — E01.0 IODINE-DEFICIENCY RELATED DIFFUSE (ENDEMIC) GOITER: ICD-10-CM

## 2024-09-06 PROCEDURE — 99215 OFFICE O/P EST HI 40 MIN: CPT

## 2024-09-06 PROCEDURE — G2211 COMPLEX E/M VISIT ADD ON: CPT

## 2024-09-07 NOTE — END OF VISIT
[FreeTextEntry3] :  All medical record entries made by the Scribe were at my, MARILEE STACK, direction and personally overseen by me on 9/6//2024. I have reviewed the chart and agree that the record accurately reflects my personal performance of the history, physical exam, assessment and plan. I have also personally directed, reviewed, and agreed with the chart.   Documented by Roverto Aquino acting as a scribe for MARILEE STACK on 9/6/2024 [Time Spent: ___ minutes] : I have spent [unfilled] minutes of time on the encounter which excludes teaching and separately reported services.

## 2024-09-07 NOTE — HISTORY OF PRESENT ILLNESS
[Home] : at home, [unfilled] , at the time of the visit. [Medical Office: ([facility])___] : at the medical office located in  [Verbal consent obtained from patient] : the patient, [unfilled] [FreeTextEntry1] : Last Prolia 8/29/24 fourth injection  89 yo WM coming for f/u for worsening osteoporosis  today asked me to have her daughter on the phone during the visit , that we did but she did not join to the video call noncompliant patient, last seen 3/2018 then 11/2019 Reclast 4/2018 and 12/2019, 2/21 2/7/2022  had it four  times  so far with no side effects  pt reports memory problems, seen Neurology tested good per pt 3hrs testing         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 Francisco/[facility]) followed by RT and then chemotherapy by Dr. Woodruff including Femara. Subsequently started on Fosamax for a while and then Actonel but eventually switched to calcitonin nasal spray. Takes calcium 500 plus DD 1400 plus multivit. Her dentist, Dr. Zeyad Bailey, in [place] agreed with her regarding a tooth extraction that Fosamax might have played a role. Recently ran out of calcitonin" per Dr Hellerman         8/2013 left hip and spine was osteopenia L spine T score -2.1        left hip was -2.3        left forearm -2.7        c/w 1997 was an increase in her L spine        no change at the hip        statistically significant left forearm        11/2017         lumbar spine vertebrae are statistically ineligible and cannot be assessed        hip T score -2.3        femoral neck T score -3.2        left forearm T score -2.9        hip no change        wrist decrease by 6.7% c/w 1997, no change c/w 2015        has a healthy diet, has no coffe but dark chocolate        Calcium and Vit D3 does not know the dose 600mg-400 IU once a day        was also on Femara         also on MVI.  12/26/23 Son and aide deonte to provide history. Aide says she has been eating well and seems to be stable. He takes her on walks and she also goes on the exercise bike.   She is still able to walk on her own and did a 1/2 mile walk yesterday. She had some knee pain but she was still able to do it.  No falls lately, but would like her to use the cane more. She has not been using it around the house.  Taking Calcium supplement 1200 mg TID (3600 mg per 24 hr). Supplement includes vitamin D 5,000 IU TID.    Last Prolia in 8/7/2023. Needs next Prolia in Feb 2024.  Diarrhea has resolved. Bowel movements have been normal.   9/6/2024 f/u:  -Per the pt's son, she is fine except she is having some physical and mental decline.   -Pt is dehydrated most of the time so she was unable to do her blood test most recently. -She is followed closely by her aide who is making sure that she is taking her medications.  -No falls or fractures recently.

## 2024-09-07 NOTE — REVIEW OF SYSTEMS
[Joint Pain] : joint pain [Negative] : Heme/Lymph [Diarrhea] : no diarrhea [FreeTextEntry7] : No more diarrhea. [FreeTextEntry9] : Hip Pain, sciatic pain.  [de-identified] : memory loss

## 2024-09-07 NOTE — REVIEW OF SYSTEMS
[Joint Pain] : joint pain [Negative] : Heme/Lymph [Diarrhea] : no diarrhea [FreeTextEntry7] : No more diarrhea. [FreeTextEntry9] : Hip Pain, sciatic pain.  [de-identified] : memory loss

## 2024-09-07 NOTE — HISTORY OF PRESENT ILLNESS
[Home] : at home, [unfilled] , at the time of the visit. [Medical Office: ([facility])___] : at the medical office located in  [Verbal consent obtained from patient] : the patient, [unfilled] [FreeTextEntry1] : Last Prolia 8/29/24 fourth injection  89 yo WM coming for f/u for worsening osteoporosis  today asked me to have her daughter on the phone during the visit , that we did but she did not join to the video call noncompliant patient, last seen 3/2018 then 11/2019 Reclast 4/2018 and 12/2019, 2/21 2/7/2022  had it four  times  so far with no side effects  pt reports memory problems, seen Neurology tested good per pt 3hrs testing         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 Francisco/[place]) followed by [facility] and then chemotherapy by Dr. Woodruff including Femara. Subsequently started on Fosamax for a while and then Actonel but eventually switched to calcitonin nasal spray. Takes calcium 500 plus DD 1400 plus multivit. Her dentist, Dr. Zeyad Bailey, in [place] agreed with her regarding a tooth extraction that Fosamax might have played a role. Recently ran out of calcitonin" per Dr Hellerman         8/2013 left hip and spine was osteopenia L spine T score -2.1        left hip was -2.3        left forearm -2.7        c/w 1997 was an increase in her L spine        no change at the hip        statistically significant left forearm        11/2017         lumbar spine vertebrae are statistically ineligible and cannot be assessed        hip T score -2.3        femoral neck T score -3.2        left forearm T score -2.9        hip no change        wrist decrease by 6.7% c/w 1997, no change c/w 2015        has a healthy diet, has no coffe but dark chocolate        Calcium and Vit D3 does not know the dose 600mg-400 IU once a day        was also on Femara         also on MVI.  12/26/23 Son and aide deonte to provide history. Aide says she has been eating well and seems to be stable. He takes her on walks and she also goes on the exercise bike.   She is still able to walk on her own and did a 1/2 mile walk yesterday. She had some knee pain but she was still able to do it.  No falls lately, but would like her to use the cane more. She has not been using it around the house.  Taking Calcium supplement 1200 mg TID (3600 mg per 24 hr). Supplement includes vitamin D 5,000 IU TID.    Last Prolia in 8/7/2023. Needs next Prolia in Feb 2024.  Diarrhea has resolved. Bowel movements have been normal.   9/6/2024 f/u:  -Per the pt's son, she is fine except she is having some physical and mental decline.   -Pt is dehydrated most of the time so she was unable to do her blood test most recently. -She is followed closely by her aide who is making sure that she is taking her medications.  -No falls or fractures recently.

## 2024-09-07 NOTE — PHYSICAL EXAM
[Alert] : alert [No Acute Distress] : no acute distress [Normal Hearing] : hearing was normal [No Respiratory Distress] : no respiratory distress [Oriented x3] : oriented to person, place, and time [Normal Affect] : the affect was normal [Normal Mood] : the mood was normal [Well Nourished] : well nourished [Healthy Appearance] : healthy appearance [Well Developed] : well developed [Normal Voice/Communication] : normal voice communication [Normal Sclera/Conjunctiva] : normal sclera/conjunctiva [Normal S1, S2] : normal S1 and S2 [Normal Rate] : heart rate was normal [de-identified] : Mildly enlarged thyroid on exam, mobile with swallowing and painless.   [de-identified] : Multiple moles.

## 2024-09-07 NOTE — PHYSICAL EXAM
[Alert] : alert [No Acute Distress] : no acute distress [Normal Hearing] : hearing was normal [No Respiratory Distress] : no respiratory distress [Oriented x3] : oriented to person, place, and time [Normal Affect] : the affect was normal [Normal Mood] : the mood was normal [Well Nourished] : well nourished [Healthy Appearance] : healthy appearance [Well Developed] : well developed [Normal Voice/Communication] : normal voice communication [Normal Sclera/Conjunctiva] : normal sclera/conjunctiva [Normal S1, S2] : normal S1 and S2 [Normal Rate] : heart rate was normal [de-identified] : Mildly enlarged thyroid on exam, mobile with swallowing and painless.   [de-identified] : Multiple moles.

## 2024-09-07 NOTE — ASSESSMENT
[FreeTextEntry1] : written instructions given to the son today present labs /US/DEXA then office visit then Prolia in this order also I advised him to bring list of meds/OTC supplies for next visit

## 2024-10-31 ENCOUNTER — TRANSCRIPTION ENCOUNTER (OUTPATIENT)
Age: 89
End: 2024-10-31

## 2025-02-12 ENCOUNTER — RX RENEWAL (OUTPATIENT)
Age: 89
End: 2025-02-12

## 2025-02-12 ENCOUNTER — RESULT REVIEW (OUTPATIENT)
Age: 89
End: 2025-02-12

## 2025-02-21 ENCOUNTER — APPOINTMENT (OUTPATIENT)
Dept: ENDOCRINOLOGY | Facility: CLINIC | Age: 89
End: 2025-02-21

## 2025-03-07 RX ORDER — DENOSUMAB 60 MG/ML
60 INJECTION SUBCUTANEOUS
Qty: 1 | Refills: 0 | Status: ACTIVE | COMMUNITY
Start: 2025-02-27

## 2025-03-11 ENCOUNTER — APPOINTMENT (OUTPATIENT)
Dept: CARDIOLOGY | Facility: CLINIC | Age: 89
End: 2025-03-11
Payer: MEDICARE

## 2025-03-11 ENCOUNTER — NON-APPOINTMENT (OUTPATIENT)
Age: 89
End: 2025-03-11

## 2025-03-11 VITALS
DIASTOLIC BLOOD PRESSURE: 76 MMHG | BODY MASS INDEX: 19.84 KG/M2 | WEIGHT: 112 LBS | SYSTOLIC BLOOD PRESSURE: 124 MMHG | HEART RATE: 53 BPM | OXYGEN SATURATION: 96 % | HEIGHT: 63 IN

## 2025-03-11 DIAGNOSIS — E78.5 HYPERLIPIDEMIA, UNSPECIFIED: ICD-10-CM

## 2025-03-11 DIAGNOSIS — I49.9 CARDIAC ARRHYTHMIA, UNSPECIFIED: ICD-10-CM

## 2025-03-11 DIAGNOSIS — I38 ENDOCARDITIS, VALVE UNSPECIFIED: ICD-10-CM

## 2025-03-11 DIAGNOSIS — I67.9 CEREBROVASCULAR DISEASE, UNSPECIFIED: ICD-10-CM

## 2025-03-11 PROCEDURE — 99215 OFFICE O/P EST HI 40 MIN: CPT | Mod: 25

## 2025-03-11 PROCEDURE — 93000 ELECTROCARDIOGRAM COMPLETE: CPT

## 2025-03-13 RX ORDER — B-COMPLEX WITH VITAMIN C
1250 (500 CA) TABLET ORAL TWICE DAILY
Qty: 180 | Refills: 0 | Status: ACTIVE | COMMUNITY
Start: 2025-03-13

## 2025-04-04 ENCOUNTER — APPOINTMENT (OUTPATIENT)
Dept: ENDOCRINOLOGY | Facility: CLINIC | Age: 89
End: 2025-04-04

## 2025-04-04 VITALS
SYSTOLIC BLOOD PRESSURE: 108 MMHG | HEIGHT: 61.75 IN | OXYGEN SATURATION: 98 % | BODY MASS INDEX: 20.8 KG/M2 | WEIGHT: 113 LBS | HEART RATE: 53 BPM | DIASTOLIC BLOOD PRESSURE: 64 MMHG

## 2025-04-04 DIAGNOSIS — E21.1 SECONDARY HYPERPARATHYROIDISM, NOT ELSEWHERE CLASSIFIED: ICD-10-CM

## 2025-04-04 DIAGNOSIS — E01.0 IODINE-DEFICIENCY RELATED DIFFUSE (ENDEMIC) GOITER: ICD-10-CM

## 2025-04-04 DIAGNOSIS — M81.0 AGE-RELATED OSTEOPOROSIS W/OUT CURRENT PATHOLOGICAL FRACTURE: ICD-10-CM

## 2025-04-04 DIAGNOSIS — K52.9 NONINFECTIVE GASTROENTERITIS AND COLITIS, UNSPECIFIED: ICD-10-CM

## 2025-04-04 PROCEDURE — 99215 OFFICE O/P EST HI 40 MIN: CPT

## 2025-04-04 PROCEDURE — G2211 COMPLEX E/M VISIT ADD ON: CPT

## 2025-04-08 LAB
24R-OH-CALCIDIOL SERPL-MCNC: 51.6 PG/ML
25(OH)D3 SERPL-MCNC: 43.1 NG/ML
ALP BLD-CCNC: 97 U/L
ANION GAP SERPL CALC-SCNC: 13 MMOL/L
BUN SERPL-MCNC: 30 MG/DL
CA-I SERPL-SCNC: 5.2 MG/DL
CALCIUM SERPL-MCNC: 9.1 MG/DL
CALCIUM SERPL-MCNC: 9.1 MG/DL
CHLORIDE SERPL-SCNC: 106 MMOL/L
CO2 SERPL-SCNC: 22 MMOL/L
CREAT SERPL-MCNC: 0.94 MG/DL
EGFRCR SERPLBLD CKD-EPI 2021: 57 ML/MIN/1.73M2
GLUCOSE SERPL-MCNC: 104 MG/DL
MAGNESIUM SERPL-MCNC: 2.1 MG/DL
PARATHYROID HORMONE INTACT: 28 PG/ML
PHOSPHATE SERPL-MCNC: 3.7 MG/DL
POTASSIUM SERPL-SCNC: 4.7 MMOL/L
SODIUM SERPL-SCNC: 141 MMOL/L

## 2025-05-12 ENCOUNTER — RX RENEWAL (OUTPATIENT)
Age: 89
End: 2025-05-12

## 2025-09-10 ENCOUNTER — APPOINTMENT (OUTPATIENT)
Dept: ENDOCRINOLOGY | Facility: CLINIC | Age: 89
End: 2025-09-10
Payer: MEDICARE

## 2025-09-10 VITALS
HEIGHT: 61 IN | DIASTOLIC BLOOD PRESSURE: 60 MMHG | BODY MASS INDEX: 22.66 KG/M2 | HEART RATE: 48 BPM | SYSTOLIC BLOOD PRESSURE: 104 MMHG | OXYGEN SATURATION: 99 % | WEIGHT: 120 LBS

## 2025-09-10 DIAGNOSIS — E21.1 SECONDARY HYPERPARATHYROIDISM, NOT ELSEWHERE CLASSIFIED: ICD-10-CM

## 2025-09-10 DIAGNOSIS — E01.0 IODINE-DEFICIENCY RELATED DIFFUSE (ENDEMIC) GOITER: ICD-10-CM

## 2025-09-10 DIAGNOSIS — M81.0 AGE-RELATED OSTEOPOROSIS W/OUT CURRENT PATHOLOGICAL FRACTURE: ICD-10-CM

## 2025-09-10 PROCEDURE — G2211 COMPLEX E/M VISIT ADD ON: CPT

## 2025-09-10 PROCEDURE — 99215 OFFICE O/P EST HI 40 MIN: CPT

## 2025-09-10 RX ORDER — ASPIRIN 81 MG/1
81 TABLET, DELAYED RELEASE ORAL
Refills: 0 | Status: ACTIVE | COMMUNITY

## 2025-09-10 RX ORDER — NITROFURANTOIN MACROCRYSTAL 100 MG
100 CAPSULE ORAL
Refills: 0 | Status: ACTIVE | COMMUNITY